# Patient Record
Sex: FEMALE | Race: WHITE | NOT HISPANIC OR LATINO | Employment: OTHER | URBAN - METROPOLITAN AREA
[De-identification: names, ages, dates, MRNs, and addresses within clinical notes are randomized per-mention and may not be internally consistent; named-entity substitution may affect disease eponyms.]

---

## 2017-01-11 ENCOUNTER — ALLSCRIPTS OFFICE VISIT (OUTPATIENT)
Dept: OTHER | Facility: OTHER | Age: 67
End: 2017-01-11

## 2017-01-19 ENCOUNTER — ALLSCRIPTS OFFICE VISIT (OUTPATIENT)
Dept: OTHER | Facility: OTHER | Age: 67
End: 2017-01-19

## 2017-02-02 ENCOUNTER — ALLSCRIPTS OFFICE VISIT (OUTPATIENT)
Dept: OTHER | Facility: OTHER | Age: 67
End: 2017-02-02

## 2017-03-08 ENCOUNTER — ALLSCRIPTS OFFICE VISIT (OUTPATIENT)
Dept: OTHER | Facility: OTHER | Age: 67
End: 2017-03-08

## 2017-08-01 ENCOUNTER — ALLSCRIPTS OFFICE VISIT (OUTPATIENT)
Dept: OTHER | Facility: OTHER | Age: 67
End: 2017-08-01

## 2017-09-08 ENCOUNTER — ALLSCRIPTS OFFICE VISIT (OUTPATIENT)
Dept: OTHER | Facility: OTHER | Age: 67
End: 2017-09-08

## 2017-09-08 DIAGNOSIS — F60.9 PERSONALITY DISORDER (HCC): ICD-10-CM

## 2017-09-08 DIAGNOSIS — F43.12 POST-TRAUMATIC STRESS DISORDER, CHRONIC: ICD-10-CM

## 2017-09-08 DIAGNOSIS — F41.1 GAD (GENERALIZED ANXIETY DISORDER): ICD-10-CM

## 2017-09-08 DIAGNOSIS — G47.09 OTHER ORGANIC INSOMNIA: ICD-10-CM

## 2017-09-08 DIAGNOSIS — F31.32 BIPOLAR I DISORDER, MOST RECENT EPISODE DEPRESSED, MODERATE (HCC): Primary | ICD-10-CM

## 2017-11-03 ENCOUNTER — ALLSCRIPTS OFFICE VISIT (OUTPATIENT)
Dept: OTHER | Facility: OTHER | Age: 67
End: 2017-11-03

## 2018-01-10 NOTE — PSYCH
Progress Note  Psychotherapy Provided [facility]: Individual Psychotherapy 50 minutes provided today  Goals addressed in session:   Goal #1  D: " I had my Right Elbow Replaced in June"   " My pain is still a 4, in my Right Hand and my Right Wrist,and I'll never be able to lift more than 1 lb  with my Right hand"   " I do some hand-exercises, that the doctor told me  "  Norma Souaustiner " Storms scare me, still----But I try to do my Deep-breathing, and I tell myself, "This will pass  "  Norma Soulier     " I also told my daughter, who is 40, that she HAS to get out of my apartment by 3 weeks from now---My apartment is for 58 and older  Dara Soulier Dara Soulier I have home Health Aides helping me  Norma Soulier Dara Soulier Dara Soulier I can't have my 6 yr old great-Grandson there either, much as I love him"  Dara Soulier Dara Soulier "His 29 yr old mother is going to have to get a place,and raise him herself, or he may actually go to [facility] "   " This is my last Therapy session---I checked with [address] ([address]), but I cannot afford the 50% payments for each session    I have a lot of doctor bills"   "I will do my one goal per day, and my Coping skills, and I will continue seeing Dr Leah Pham for my Psych meds "   Pt Sadie Treadwell) has a constricted affect  Has a moderately depressed mood  Denies SI Dana Backer [address]  Her sleep is slightly better than in the past  She continues meds as prescribed by Dr Leah Pham  She processes her thoughts, feelings,and behaviors  She does Cognitive-reframing, re: herself, and her Coping skills , going forward  She set limits on her daughter, 40, and is getting her out of pt's affordable [address], per law, pt says  She also told her granddaughter that she has to raise her own 6 yr old son, and get him into a [employer]  for the new School year, soon  We review Calm, Deep-breathing/ Relaxation/ positive Visualization exercises---pt will do at home  Kyung Pemberton says this is her last psychotherapy burke't, due to multiple Medical / Psych   expenses,and that she cannot afford [facility] arrangements, either  She may re-apply to [facility], "where I can get psych care free", she states  Active Listening, Support,and Validation also given in session  She thanks this Therapist for services  A: Bipolar, depressed, moderate, F31 32; Generalized Anxiety Disorder, F41 10; R/O PTSD, chronic, F43 12,and family stressors and medical stressors; R/O Personalty Disorder, F60 9  Tisha Lr Pt Zulema Ache) is stable for discharge  as requested  P: Pt Zulema Ache) is Discharged from Therapy  She will continue seeing Slava Murray for psych  medications  Pain Scale and Suicide Risk [facility]: Current Pain Assessment: moderate to severe   On a scale of 0 to 10, the patient rates current pain at 4   Current suicide risk is low   Behavioral Health Treatment Plan [place]: Diagnosis and Treatment Plan explained to patient, patient relates understanding diagnosis and is agreeable to Treatment Plan  Assessment    1  Bipolar I disorder, most recent episode depressed, moderate (296 52) (F31 32)   2  TATIANA (generalized anxiety disorder) (300 02) (F41 1)   3  Personality disorder (301 9) (F60 9)   4   PTSD (post-traumatic stress disorder) (309 81) (F43 10)    Signatures   Electronically signed by : Christopher Bautista MSAPRNPMHCNS-BC; Aug  1 2017 11:32AM EST                       (Author)

## 2018-01-11 NOTE — PSYCH
Psych Med Mgmt    Appearance: was calm and cooperative, adequate hygiene and grooming and good eye contact  Observed mood: mood appropriate  Observed mood: affect appropriate  Speech: a normal rate  Thought processes: coherent/organized  Hallucinations: no hallucinations present  Thought Content: no delusions  Abnormal Thoughts: The patient has no suicidal thoughts and no homicidal thoughts  Orientation: The patient is oriented to person, place and time  Recent and Remote Memory: short term memory intact and long term memory intact  Attention Span And Concentration: concentration intact  Insight: Insight intact  Judgment: Her judgment was intact  Muscle Strength And Tone  Muscle strength and tone were normal  Normal gait and station  Language: no difficulty naming common objects, no difficulty repeating a phrase and no difficulty writing a sentence  Fund of knowledge: Patient displays adequate knowledge of current events, adequate fund of knowledge regarding past history and adequate fund of knowledge regarding vocabulary  The patient is experiencing moderate to severe pain  On a scale of 0 - 10 the pain severity is a 3  Treatment Recommendations: Continue Lamictal 300 mg at 6 PM  Continue Ativan 1 mg to 2 mg bid and 2 mg at bedtime as needed  Continue Cymbalta 120 mg daily  Continue Abilify 5 mg at 6 PM  Continue Ambien CR 6 25 mg at bedtime as needed   Follows with PCP for glucose and lipid monitoring  The patient has been filing controlled prescriptions on time as prescribed according to [address]    Risks, Benefits And Possible Side Effects Of Medications: Risks, benefits, and possible side effects of medications explained to patient and patient verbalizes understanding  She reports normal appetite, normal energy level, no weight change and decrease in number of sleep hours 4       Lonza Lunch states she continues to do well since last visit  Mood remains stable, no significant depression  Anxiety is controlled well  No suicidal or homicidal ideation  No psychotic symptoms  Sleep is still poor "sometimes I don't sleep at all"  Wants to get sleep study, reports snoring at night  No side effects from medications reported  No rash  Vitals  Signs   Recorded: 36ZOZ6864 49:56XQ   Systolic: 685  Diastolic: 76  Heart Rate: 73  Height: 5 ft 4 in  Weight: 223 lb   BMI Calculated: 38 28  BSA Calculated: 2 05    Assessment    1  Bipolar I disorder, most recent episode depressed, in full remission (296 56) (F31 76)   2  Anxiety disorder, unspecified (300 00) (F41 9)   3  Personality disorder (301 9) (F60 9)   4  Insomnia (780 52) (G47 00)    Plan    1  LORazepam 1 MG Oral Tablet; Take 1 or 2 tablets twice a day and 2 tablets at   bedtime as needed    2  Cymbalta 60 MG Oral Capsule Delayed Release Particles (DULoxetine HCl);   TAKE 2 CAPSULES DAILY    3  Follow-up visit in 4 Months Evaluation and Treatment  Follow-up  Status: Hold For -   Scheduling  Requested for: 82Bll0625    4  ARIPiprazole 5 MG Oral Tablet (Abilify); Take 1 tablet at 6 PM   5  LamoTRIgine 150 MG Oral Tablet; Take 2 tablets at 6 PM    6  Zolpidem Tartrate ER 6 25 MG Oral Tablet Extended Release; TAKE 1 TABLET AT    BEDTIME AS NEEDED FOR INSOMNIA    Review of Systems    Constitutional: No fever, no chills, feels well, no tiredness, no recent weight gain or loss  Cardiovascular: no complaints of slow or fast heart rate, no chest pain, no palpitations  Respiratory: no complaints of shortness of breath, no wheezing, no dyspnea on exertion  Gastrointestinal: no complaints of abdominal pain, no constipation, no nausea, no diarrhea, no vomiting  Genitourinary: no complaints of dysuria, no incontinence, no pelvic pain, no urinary frequency  Musculoskeletal: shoulder pain  Integumentary: no complaints of skin rash, no itching, no dry skin     Neurological: no complaints of headache, no confusion, no numbness, no dizziness  Past Psychiatric History    Past Psychiatric History: Two past inpatient psychiatric admissions  3 past suicide attempts (overdose and cut wrists) - last time few years ago  Also attended partial program in past         Substance Abuse Hx    Substance Abuse History: No history of drug or alcohol use  Active Problems    1  Anxiety disorder, unspecified (300 00) (F41 9)   2  Arthritis (716 90) (M19 90)   3  Bipolar I disorder, most recent episode depressed, in full remission (296 56) (F31 76)   4  Hyperlipidemia (272 4) (E78 5)   5  Insomnia (780 52) (G47 00)   6  Personality disorder (301 9) (F60 9)   7  Scoliosis (737 30) (M41 9)   8  Vertigo (780 4) (R42)    Past Medical History    1  Denied: History of head injury   2  Denied: History of Seizures    The active problems and past medical history were reviewed and updated today  Allergies    1  Erythromycin Derivatives   2  Tetracyclines   3  Trazodone and Deriv   4  Aspirin TABS   5  Crestor TABS   6  Lipitor TABS   7  Pravachol TABS   8  Bactrim TABS    Current Meds   1  ARIPiprazole 5 MG Oral Tablet; Take 1 tablet at 6 PM;   Therapy: 43YNV1590 to (Evaluate:83Tmu5366)  Requested for: 50QFG6419; Last   Rx:00Ncv0526 Ordered   2  Cymbalta 60 MG Oral Capsule Delayed Release Particles; TAKE 2 CAPSULES DAILY; Therapy: 69GKE9685 to (Evaluate:14Oct2016)  Requested for: 23QJW8247; Last   Rx:88Nre3914 Ordered   3  LamoTRIgine 150 MG Oral Tablet; Take 2 tablets at 6 PM;   Therapy: 24JGL4371 to (Evaluate:14Oct2016)  Requested for: 66CGZ1056; Last   Rx:99Aes3341 Ordered   4  LORazepam 1 MG Oral Tablet; Take 1 or 2 tablets twice a day and 2 tablets at bedtime as   needed; Therapy: 06FSV9811 to (Evaluate:30Apr2016)  Requested for: 39Pga3362; Last   Rx:10But8831 Ordered   5  Verapamil HCl - 80 MG Oral Tablet; Therapy: 21RYG5727 to (Last Rx:39Ojl0507)  Requested for: 07Ifx0455 Ordered   6   Zolpidem Tartrate ER 6 25 MG Oral Tablet Extended Release; TAKE 1 TABLET AT    BEDTIME AS NEEDED FOR INSOMNIA; Therapy: 37Sgt9673 to 927 70 139)  Requested for: 51Bhq5388; Last   Rx:51Xzr3759 Ordered    The medication list was reviewed and updated today  Family Psych History  Mother    1  Family history of Bipolar disorder  Sister    2  Family history of Bipolar disorder  Brother    3  Family history of Bipolar disorder   4  Family history of suicide (V17 0) (Z81 8)  Aunt    5  Family history of paranoid schizophrenia (V17 0) (Z81 8)    The family history was reviewed and updated today  Social History    · Former smoker (I48 29) (F13 042)   · No alcohol use   · No drug use  The social history was reviewed and updated today  The social history was reviewed and is unchanged  , lives alone  Has adult son and daughter  Works as a  for life skills class  Associate in degree in applied sciences  No history of legal problems  No  history  History Of Phys/Sex Abuse Or Perpetration    History Of Phys/Sex Abuse or Perpetration: No history of physical or sexual abuse  End of Encounter Meds    1  LORazepam 1 MG Oral Tablet; Take 1 or 2 tablets twice a day and 2 tablets at bedtime as   needed; Therapy: 84FVT5573 to (Evaluate:24Vfn8231)  Requested for: 66Bdw8855; Last   Rx:72Jsf6236 Ordered    2  Cymbalta 60 MG Oral Capsule Delayed Release Particles (DULoxetine HCl); TAKE 2   CAPSULES DAILY; Therapy: 88ISX8772 to (Evaluate:49Lur8664)  Requested for: 47Pet0401; Last   Rx:41Qqn3740 Ordered    3  ARIPiprazole 5 MG Oral Tablet (Abilify); Take 1 tablet at 6 PM;   Therapy: 21Wac0047 to (Evaluate:98Zda8077)  Requested for: 95Itb6547; Last   Rx:27Ikq4100 Ordered   4  LamoTRIgine 150 MG Oral Tablet; Take 2 tablets at 6 PM;   Therapy: 08Byf1759 to (Evaluate:28Gqw6875)  Requested for: 70Ebq6607; Last   Rx:11Xom2950 Ordered    5   Zolpidem Tartrate ER 6 25 MG Oral Tablet Extended Release; TAKE 1 TABLET AT    BEDTIME AS NEEDED FOR INSOMNIA; Therapy: 12Rcz9822 to (Evaluate:27Dgy0589)  Requested for: 59Asy8350; Last   Rx:67Vkx4237 Ordered    6  Verapamil HCl - 80 MG Oral Tablet;    Therapy: 57FBX9101 to (Last Rx:29Tvy6130)  Requested for: 08UEN9183 Ordered    Signatures   Electronically signed by : RENEE Zepeda ; Sep 15 2016  4:21PM EST                       (Author)

## 2018-01-12 ENCOUNTER — GENERIC CONVERSION - ENCOUNTER (OUTPATIENT)
Dept: OTHER | Facility: OTHER | Age: 68
End: 2018-01-12

## 2018-01-12 ENCOUNTER — ALLSCRIPTS OFFICE VISIT (OUTPATIENT)
Dept: OTHER | Facility: OTHER | Age: 68
End: 2018-01-12

## 2018-01-12 NOTE — PSYCH
Progress Note  Psychotherapy Provided [facility]: Individual Psychotherapy 50 minutes provided today  Goals addressed in session:   Goal #1 (See Treatment Plan, completed today )  D: " I don't like looking out my kitchen window, where there is construction on the building---it reminds me of the accident in November 2016, when a big piece of plywood fell down from the high building, and hit the ground in front of me,and I hit the ground"   " My Right arm, right elbow was broken and it has a titanium andrea in it  [place] [employer] My Right Arm Pain=6 today "   " I avoid going out the front door of my building, I avoid looking at that construction area   "   " I am trying to keep my self busy   but I have Fear, anxiety---I want to get over this fear, and live my life "   " My Bipolar daughter Kira Campa, , and her 6 yr old son (my great-grandson, actually), moved in with me last week, temporarily  [place] [employer] [place] [employer] My daughter and I don't get along that well, but I'm trying to help with my grandson, before and after school, while my daughter works   "   Pt has an anxious, hyperverbal, somatically preoccupied affect  Mood is anxious and she has Bipolar depression  Pt denies SANTANA Jacobs Speaker [facility] Ariana Noguera [facility]  Pt processes more of her trauma, from the November 2016 accident at [facility] building/ construction site  Pt has Cues of the accident,and she gets occasional Flashbacks, and nightmares at night  Pt has Trauma Therapy today  We also do Deep- breathing, and Cognitive -reframing, toward use of her Survivor strengths  We do pt's Treatment Plan---pt is starting to utilize more Coping skills, and doing more activities (see Tx  Plan ) Listening, Support,and Validation also given  Pt continues meds as prescribed by her psychiatrist  Pt to try NOT to let the past trauma run her current life  A: Bipolar Disorder, deprerssed, F31 32; PTSD, F43 10; Generalized Anxiety Disorder, F41 10; and Personality Disorder; Pain  P: Continue Treatment Plan, Meds, and Psychotherapy  Pain Scale and Suicide Risk [facility]: Current Pain Assessment: moderate to severe   On a scale of 0 to 10, the patient rates current pain at 6   Current suicide risk is low   Behavioral Health Treatment Plan [facility]: Diagnosis and Treatment Plan explained to patient, patient relates understanding diagnosis and is agreeable to Treatment Plan  Assessment    1  Bipolar I disorder, most recent episode depressed, moderate (296 52) (F31 32)   2  PTSD (post-traumatic stress disorder) (309 81) (F43 10)   3  TATIANA (generalized anxiety disorder) (300 02) (F41 1)   4   Chronic pain (338 29) (G89 29)    Signatures   Electronically signed by : Ayden Cruz MSAPRNPMHCNS-BC; Feb 2 2017  4:00PM EST                       (Author)

## 2018-01-13 VITALS
DIASTOLIC BLOOD PRESSURE: 88 MMHG | WEIGHT: 234 LBS | HEART RATE: 86 BPM | HEIGHT: 64 IN | SYSTOLIC BLOOD PRESSURE: 139 MMHG | BODY MASS INDEX: 39.95 KG/M2

## 2018-01-13 NOTE — PSYCH
Psych Med Mgmt    Appearance: was calm and cooperative, adequate hygiene and grooming and good eye contact  Observed mood: depressed and anxious  Observed mood: affect was constricted  Speech: speech soft and fluent  Thought processes: coherent/organized  Hallucinations: no hallucinations present  Thought Content: no delusions  Abnormal Thoughts: The patient has no suicidal thoughts and no homicidal thoughts  Orientation: The patient is oriented to person, place and time  Recent and Remote Memory: short term memory intact and long term memory intact  Attention Span And Concentration: concentration impaired  Insight: Insight intact  Judgment: Her judgment was intact  Muscle Strength And Tone  Muscle strength and tone were normal    Language: no difficulty naming common objects, no difficulty repeating a phrase and no difficulty writing a sentence  Fund of knowledge: Patient displays adequate knowledge of current events, adequate fund of knowledge regarding past history and adequate fund of knowledge regarding vocabulary  The patient is experiencing moderate to severe pain  On a scale of 0 - 10 the pain severity is a 4  Treatment Recommendations: Continue Lamictal 300 mg at 6 PM  Continue Ativan 1 mg to 2 mg bid and 2 mg at bedtime as needed  Continue Cymbalta 120 mg daily  Continue Abilify 15 mg at 6 PM  Continue Ambien CR 6 25 mg at bedtime as needed   Follows with PCP for glucose and lipid monitoring  Does not want to see a therapist      Risks, Benefits And Possible Side Effects Of Medications: Risks, benefits, and possible side effects of medications explained to patient and patient verbalizes understanding  The patient has been filling controlled prescriptions on time as prescribed to [facility] program       She reports normal appetite, normal energy level, recent 4 lbs weight gain  and decrease in number of sleep hours 4  Joanie Mendiola states she has been feeling slightly less depressed since last visit  Still has anxiety symptoms ans states she is still trying to avoid going outside on windy days due to memories of her accident  No suicidal or homicidal ideation  No psychotic symptoms  Sleep is decreased  Appetite is adequate  No side effects from medications reported  No rash at present - had an allergic reaction to prednisone drops before her cataract surgery, received epinephrine injection and Benadryl with resolution of symptoms  Vitals  Signs   Recorded: 38CER7138 03:41PM   Heart Rate: 86  Systolic: 390  Diastolic: 88  BP Cuff Size: Large  Height: 5 ft 4 in  Weight: 234 lb   BMI Calculated: 40 17  BSA Calculated: 2 09    Assessment    1  TATIANA (generalized anxiety disorder) (300 02) (F41 1)   2  Bipolar I disorder, most recent episode depressed, moderate (296 52) (F31 32)   3  Other insomnia (780 52) (G47 09)   4  Personality disorder (301 9) (F60 9)   5  Post-traumatic stress disorder, chronic (309 81) (F43 12)    Plan    1  Follow-up visit in 2 months Evaluation and Treatment  Follow-up  Status: Hold For -   Scheduling  Requested for: 01SMH8234    2  Zolpidem Tartrate ER 6 25 MG Oral Tablet Extended Release; TAKE 1 TABLET AT    BEDTIME AS NEEDED FOR INSOMNIA; Do Not Fill Before: 72EIC7022    Review of Systems    Constitutional: recent 4 lb weight gain and feeling tired  Cardiovascular: no complaints of slow or fast heart rate, no chest pain, no palpitations  Respiratory: no complaints of shortness of breath, no wheezing, no dyspnea on exertion  Gastrointestinal: no complaints of abdominal pain, no constipation, no nausea, no diarrhea, no vomiting  Genitourinary: no complaints of dysuria, no incontinence, no pelvic pain, no urinary frequency  Musculoskeletal: hand pain  Integumentary: no complaints of skin rash, no itching, no dry skin     Neurological: no complaints of headache, no confusion, no numbness, no dizziness  Past Psychiatric History    Past Psychiatric History: Two past inpatient psychiatric admissions  3 past suicide attempts (overdose and cut wrists) - last time few years ago  Also attended partial program in past         Substance Abuse Hx    Substance Abuse History: No history of drug or alcohol use  Active Problems    1  Arthritis (716 90) (M19 90)   2  Bipolar I disorder, most recent episode depressed, moderate (296 52) (F31 32)   3  Chronic pain (338 29) (G89 29)   4  Elbow fracture, right (812 40) (S42 401A)   5  TATIANA (generalized anxiety disorder) (300 02) (F41 1)   6  Hyperlipidemia (272 4) (E78 5)   7  Other insomnia (780 52) (G47 09)   8  Personality disorder (301 9) (F60 9)   9  Post-traumatic stress disorder, chronic (309 81) (F43 12)   10  Scoliosis (737 30) (M41 9)   11  Vertigo (780 4) (R42)    Past Medical History    1  Denied: History of head injury   2  Denied: History of Seizures    The active problems and past medical history were reviewed and updated today  Allergies    1  Erythromycin Derivatives   2  Tetracyclines   3  Trazodone and Deriv   4  Aspirin TABS   5  Crestor TABS   6  Lipitor TABS   7  Pravachol TABS   8  Bactrim TABS    Current Meds   1  ARIPiprazole 15 MG Oral Tablet; TAKE 1 TABLET AT BEDTIME; Therapy: 80CJI2376 to (Evaluate:06Jan2018)  Requested for: 08Sep2017; Last   Rx:15Hph3420 Ordered   2  Cymbalta 60 MG Oral Capsule Delayed Release Particles; TAKE 2 CAPSULES DAILY; Therapy: 95ISN5164 to (Evaluate:06Jan2018)  Requested for: 93Qgk9207; Last   Rx:17Ihe2253 Ordered   3  LamoTRIgine 150 MG Oral Tablet; Take 2 tablets at 6 PM;   Therapy: 75ULZ2063 to (Evaluate:06Jan2018)  Requested for: 05Kmr7411; Last   Rx:63Auj0652 Ordered   4  LORazepam 1 MG Oral Tablet; Take 1 or 2 tablets twice a day and 2 tablets at bedtime as   needed; Therapy: 73ZAM0487 to (Evaluate:06Jan2018)  Requested for: 32Ydt5872; Last   Rx:61Qun5301 Ordered   5   Verapamil HCl - 80 MG Oral Tablet; Therapy: 61EIE7973 to (Last Rx:81Xne4118)  Requested for: 50Sqa9860 Ordered   6  Zolpidem Tartrate ER 6 25 MG Oral Tablet Extended Release; TAKE 1 TABLET AT    BEDTIME AS NEEDED FOR INSOMNIA; Therapy: 83Jpz7031 to (Evaluate:17Nsz5357)  Requested for: 39Rxl0920; Last   Rx:65Zbs7065 Ordered    The medication list was reviewed and updated today  Family Psych History  Mother    1  Family history of Bipolar disorder  Sister    2  Family history of Bipolar disorder  Brother    3  Family history of Bipolar disorder   4  Family history of suicide (V17 0) (Z81 8)  Aunt    5  Family history of paranoid schizophrenia (V17 0) (Z81 8)    The family history was reviewed and updated today  Social History    · Former smoker (V15 82) (E29 858)   · Living alone (V60 3) (Z60 2)   · No alcohol use   · No drug use   ·  (V61 03) (Z63 5)  The social history was reviewed and updated today  The social history was reviewed and is unchanged  , lives with daughter and great grandson  Has adult son and daughter  Worked as a  for life skills class, now retired  Associate in degree in applied sciences  No history of legal problems  No  history  History Of Phys/Sex Abuse Or Perpetration    History Of Phys/Sex Abuse or Perpetration: No history of physical or sexual abuse  End of Encounter Meds    1  ARIPiprazole 15 MG Oral Tablet; TAKE 1 TABLET AT BEDTIME; Therapy: 81JYJ6793 to (Evaluate:06Jan2018)  Requested for: 21Ikb7879; Last   Rx:02Gpd9316 Ordered   2  LamoTRIgine 150 MG Oral Tablet; Take 2 tablets at 6 PM;   Therapy: 45VNJ1293 to (Evaluate:06Jan2018)  Requested for: 91Xki6667; Last   Rx:42Fxf2228 Ordered    3  Cymbalta 60 MG Oral Capsule Delayed Release Particles (DULoxetine HCl); TAKE 2   CAPSULES DAILY; Therapy: 29TWX4198 to (Evaluate:06Jan2018)  Requested for: 85Rpk0176; Last   Rx:38Jfj5793 Ordered    4  LORazepam 1 MG Oral Tablet;  Take 1 or 2 tablets twice a day and 2 tablets at bedtime as   needed; Therapy: 61GBC6050 to (Evaluate:06Jan2018)  Requested for: 48Bhk7266; Last   Rx:08Sep2017 Ordered    5  Zolpidem Tartrate ER 6 25 MG Oral Tablet Extended Release; TAKE 1 TABLET AT    BEDTIME AS NEEDED FOR INSOMNIA; Therapy: 28Apr2016 to (Evaluate:15Apr2018)  Requested for: 69CRD3371; Last   Rx:18Neb6102 Ordered    6  Verapamil HCl - 80 MG Oral Tablet;    Therapy: 11QDP3381 to (Last Rx:20Uiy9998)  Requested for: 38FSX1175 Ordered    Signatures   Electronically signed by : Beryle Berkshire, M D ; Nov  3 2017  3:54PM EST                       (Author)

## 2018-01-15 NOTE — PSYCH
Progress Note  Psychotherapy Provided [facility]: Individual Psychotherapy 45 minutes provided today  Goals addressed in session:   Goal #1  D: "If I cry some days, I try to put my mind to something else, and do an activity, instead of crying all day "   " I'm able to look out my kitchen window, now, instead of avoiding it "   " The construction is still going on   "   " My Pain= 7, in my Right Elbow and right Arm, from the accident from the Construction,and from the Surgery I had---Now I found out from my doctor that some of the heads of the screws came off, in my arm,and I'll probably need other surgery  I hate the thought of more surgery, because it means MORE PAIN  Kamran Heckler Kamran Heckler And, I lost a lot of blood, in my last surgery, so I had pretty bad anemia after it   "   Pt has a subdued, quiet, moderately depressed , moderately anxious affect and mood  Her sleep is still low, only 3 hrs a night  Appetite is fair to low  Pt denies SANTANA Meier [facility] 116  PHQ9= 6 today  Pt has ongoing Pain =7, in her Right elbow/ arm  Pt processes her thoughts, feelings,and behaviors  Processes her fear of having to undergo more potential surgery on her right arm  Pt has brief tearfulness at home , sometimes---> she mathew with it by : Positive Self-talk, deep-breathing, texting her Grandchildren or friend, going to activities at [employer], going to [address] (seated), seeing her friend Zackery Moralez, going to [address]  at her building, and other positive activities  Listening, Support,and Validation given in session  Some psychoeducation given, re: Trauma responses,and also I answered her questions about personality disorders  Cognitive-reframing done, re: use of her Survivor Strengths and positives, and affirmation given for her efforts each day  Pt continues meds prescribed by physicians  Pt has severe financial stress, so I gave her the phone number of [facility]   A: Bipolar Disorder, depressed, F31 32; PTSD, F43 10;  Generalized Anxiety Disorder, F41 10, and chronic pain  P: Continue Treatment Plan, Meds,and Psychotherapy  Pain Scale and Suicide Risk [facility]: Current Pain Assessment: moderate to severe   On a scale of 0 to 10, the patient rates current pain at 7   Current suicide risk is low   Behavioral Health Treatment Plan [employer]: Diagnosis and Treatment Plan explained to patient, patient relates understanding diagnosis and is agreeable to Treatment Plan  Results/Data  PHQ-9 Adult Depression Screening [id number] 02:08PM Fatemeh Herring     Test Name Result Flag Reference   PHQ-9 Adult Depression Score 6     Over the last two weeks, how often have you been bothered by any of the following problems? Little interest or pleasure in doing things: Several days - 1  Feeling down, depressed, or hopeless: More than half the days - 2  Trouble falling or staying asleep, or sleeping too much: Several days - 1  Feeling tired or having little energy: Not at all - 0  Poor appetite or over eating: Several days - 1  Feeling bad about yourself - or that you are a failure or have let yourself or your family down: Not at all - 0  Trouble concentrating on things, such as reading the newspaper or watching television: Several days - 1  Moving or speaking so slowly that other people could have noticed  Or the opposite -  being so fidgety or restless that you have been moving around a lot more than usual: Not at all - 0  Thoughts that you would be better off dead, or of hurting yourself in some way: Not at all - 0   PHQ-9 Adult Depression Screening Negative     PHQ-9 Difficulty Level Somewhat difficult     PHQ-9 Severity Mild Depression         Assessment    1  Bipolar I disorder, most recent episode depressed, moderate (296 52) (F31 32)   2  PTSD (post-traumatic stress disorder) (309 81) (F43 10)   3  TATIANA (generalized anxiety disorder) (300 02) (F41 1)   4  Chronic pain (338 29) (G89 29)   5   Personality disorder (301 9) (F60 9)    Signatures   Electronically signed by : JOSHUA Mahoney; Mar  8 2017  2:40PM EST                       (Author)    Electronically signed by : JOSHUA Mahoney; Mar  8 2017  2:40PM EST                       (Author)

## 2018-01-16 NOTE — PSYCH
Psych Med Mgmt    Appearance: was calm and cooperative, adequate hygiene and grooming and demonstrated behavior psychomotor retardation  Observed mood: depressed and anxious  Observed mood: affect was constricted  Speech: speech soft and fluent  Thought processes: coherent/organized  Hallucinations: no hallucinations present  Thought Content: no delusions  Abnormal Thoughts: The patient has no suicidal thoughts and no homicidal thoughts  Orientation: The patient is oriented to person, place and time  Recent and Remote Memory: short term memory intact and long term memory intact  Attention Span And Concentration: concentration impaired  Insight: Insight intact  Judgment: Her judgment was intact  Muscle Strength And Tone  Muscle strength and tone were normal  Normal gait and station  Language: no difficulty naming common objects, no difficulty repeating a phrase and no difficulty writing a sentence  Fund of knowledge: Patient displays adequate knowledge of current events, adequate fund of knowledge regarding past history and adequate fund of knowledge regarding vocabulary  The patient is experiencing moderate to severe pain  On a scale of 0 - 10 the pain severity is a 3  Treatment Recommendations: Continue Lamictal 300 mg at 6 PM  Continue Ativan 1 mg to 2 mg bid and 2 mg at bedtime as needed  Continue Cymbalta 120 mg daily  Increase Abilify to 15 mg at 6 PM to help with PTSD symptoms  Continue Ambien CR 6 25 mg at bedtime as needed   Follows with PCP for glucose and lipid monitoring  No longer sees a therapist due to financial reasons  Risks, Benefits And Possible Side Effects Of Medications: Risks, benefits, and possible side effects of medications explained to patient and patient verbalizes understanding             Discussed with patient the risks of sedation, respiratory depression, impairment of ability to drive and potential for abuse and addiction related to treatment with benzodiazepine medications  The patient understands risk of treatment with benzodiazepine medications, agrees to not drive if feels impaired and agrees to take medications as prescribed  The patient has been filling controlled prescriptions on time as prescribed to [facility] program       She reports increased appetite, decreased energy, recent 8 lbs weight gain  and normal number of sleep hours  Sheree states she has been feeling depressed on and off recently - had to retire from her job due to her accident, also has financial problems  Fees sad at times, also has anxiety symptoms  No suicidal or homicidal ideation  No psychotic symptoms  Sleep is improved  Appetite is increased  No side effects from medications reported  No rash  Vitals  Signs   Recorded: 08Sep2017 02:46PM   Heart Rate: 83  Systolic: 044  Diastolic: 81  BP Cuff Size: Large  Height: 5 ft 4 in  Weight: 230 lb   BMI Calculated: 39 48  BSA Calculated: 2 08    Assessment    1  Bipolar I disorder, most recent episode depressed, moderate (296 52) (F31 32)   2  TATIANA (generalized anxiety disorder) (300 02) (F41 1)   3  Post-traumatic stress disorder, chronic (309 81) (F43 12)   4  Personality disorder (301 9) (F60 9)   5  Other insomnia (780 52) (G47 09)    Plan    1  ARIPiprazole 15 MG Oral Tablet; TAKE 1 TABLET AT BEDTIME   2  LamoTRIgine 150 MG Oral Tablet; Take 2 tablets at 6 PM    3  Cymbalta 60 MG Oral Capsule Delayed Release Particles (DULoxetine HCl);   TAKE 2 CAPSULES DAILY    4  Follow-up visit in 2 months Evaluation and Treatment  Follow-up  Status: Hold For -   Scheduling  Requested for: 08Sep2017    5  LORazepam 1 MG Oral Tablet; Take 1 or 2 tablets twice a day and 2 tablets at   bedtime as needed    6   Zolpidem Tartrate ER 6 25 MG Oral Tablet Extended Release; TAKE 1 TABLET AT    BEDTIME AS NEEDED FOR INSOMNIA; Do Not Fill Before: [id number]    Review of Systems    Constitutional: recent 8 lb weight gain and feeling tired  Cardiovascular: no complaints of slow or fast heart rate, no chest pain, no palpitations  Respiratory: no complaints of shortness of breath, no wheezing, no dyspnea on exertion  Gastrointestinal: no complaints of abdominal pain, no constipation, no nausea, no diarrhea, no vomiting  Genitourinary: no complaints of dysuria, no incontinence, no pelvic pain, no urinary frequency  Musculoskeletal: hand pain  Integumentary: no complaints of skin rash, no itching, no dry skin  Neurological: no complaints of headache, no confusion, no numbness, no dizziness  Past Psychiatric History    Past Psychiatric History: Two past inpatient psychiatric admissions  3 past suicide attempts (overdose and cut wrists) - last time few years ago  Also attended partial program in past         Substance Abuse Hx    Substance Abuse History: No history of drug or alcohol use  Active Problems    1  Arthritis (716 90) (M19 90)   2  Bipolar I disorder, most recent episode depressed, moderate (296 52) (F31 32)   3  Chronic pain (338 29) (G89 29)   4  Elbow fracture, right (812 40) (S42 401A)   5  TATIANA (generalized anxiety disorder) (300 02) (F41 1)   6  Hyperlipidemia (272 4) (E78 5)   7  Personality disorder (301 9) (F60 9)   8  Scoliosis (737 30) (M41 9)   9  Vertigo (780 4) (R42)    Past Medical History    1  Denied: History of head injury   2  Denied: History of Seizures    The active problems and past medical history were reviewed and updated today  Allergies    1  Erythromycin Derivatives   2  Tetracyclines   3  Trazodone and Deriv   4  Aspirin TABS   5  Crestor TABS   6  Lipitor TABS   7  Pravachol TABS   8  Bactrim TABS    Current Meds   1  ARIPiprazole 10 MG Oral Tablet; TAKE 1 TABLET AT BEDTIME; Therapy: 96NBP9399 to (Evaluate:10Jun2017)  Requested for: 46DET3794; Last   Rx:11Jan2017 Ordered   2   Cymbalta 60 MG Oral Capsule Delayed Release Particles; TAKE 2 CAPSULES DAILY; Therapy: 57PLX9504 to ([phone])  Requested for: 27Wdl6615; Last   Rx:79Qks9344 Ordered   3  LamoTRIgine 150 MG Oral Tablet; Take 2 tablets at 6 PM;   Therapy: 43Diq3023 to (Evaluate:10Jun2017)  Requested for: 31SEL9669; Last   Rx:11Jan2017 Ordered   4  LORazepam 1 MG Oral Tablet; Take 1 or 2 tablets twice a day and 2 tablets at bedtime as   needed; Therapy: 83BTQ7503 to (Evaluate:82Xrk6834)  Requested for: 31XXE9797; Last   Rx:11Jan2017 Ordered   5  Verapamil HCl - 80 MG Oral Tablet; Therapy: 10HXH2833 to (Last Rx:01Feb2011)  Requested for: 01Feb2011 Ordered   6  Zolpidem Tartrate ER 6 25 MG Oral Tablet Extended Release; TAKE 1 TABLET AT    BEDTIME AS NEEDED FOR INSOMNIA; Therapy: 28Apr2016 to (Evaluate:17Oct2017)  Requested for: 77DDQ7650; Last   Rx:07Sgh0791 Ordered    The medication list was reviewed and updated today  Family Psych History  Mother    1  Family history of Bipolar disorder  Sister    2  Family history of Bipolar disorder  Brother    3  Family history of Bipolar disorder   4  Family history of suicide (V17 0) (Z81 8)  Aunt    5  Family history of paranoid schizophrenia (V17 0) (Z81 8)    The family history was reviewed and updated today  Social History    · Former smoker (V15 82) (Z07 044)   · Living alone (V60 3) (Z60 2)   · No alcohol use   · No drug use   ·  (V61 03) (Z63 5)  The social history was reviewed and updated today  , lives with daughter and great grandson  Has adult son and daughter  Worked as a  for life skills class, now retired  Associate in degree in applied sciences  No history of legal problems  No  history  History Of Phys/Sex Abuse Or Perpetration    History Of Phys/Sex Abuse or Perpetration: No history of physical or sexual abuse  End of Encounter Meds    1  ARIPiprazole 15 MG Oral Tablet; TAKE 1 TABLET AT BEDTIME;    Therapy: 91EEB8535 to (Evaluate:06Jan2018)  Requested for: 08Sep2017; Last   Rx:08Sep2017 Ordered   2  LamoTRIgine 150 MG Oral Tablet; Take 2 tablets at 6 PM;   Therapy: 50DBN9291 to (Evaluate:06Jan2018)  Requested for: 08Sep2017; Last   Rx:08Sep2017 Ordered    3  Cymbalta 60 MG Oral Capsule Delayed Release Particles (DULoxetine HCl); TAKE 2   CAPSULES DAILY; Therapy: 53LWV1986 to (Evaluate:06Jan2018)  Requested for: 08Sep2017; Last   Rx:08Sep2017 Ordered    4  LORazepam 1 MG Oral Tablet; Take 1 or 2 tablets twice a day and 2 tablets at bedtime as   needed; Therapy: 10BPI3145 to (Evaluate:06Jan2018)  Requested for: 08Sep2017; Last   Rx:08Sep2017 Ordered    5  Zolpidem Tartrate ER 6 25 MG Oral Tablet Extended Release; TAKE 1 TABLET AT    BEDTIME AS NEEDED FOR INSOMNIA; Therapy: 28Apr2016 to (Evaluate:66Vwy1740)  Requested for: 08Sep2017; Last   Rx:08Sep2017 Ordered    6  Verapamil HCl - 80 MG Oral Tablet;    Therapy: 88YIY0574 to (Last Rx:20Kvh2395)  Requested for: 76AOK0754 Ordered    Signatures   Electronically signed by : RENEE Pollard ; Sep  8 2017  3:03PM EST                       (Author)

## 2018-01-16 NOTE — PSYCH
Psych Med Mgmt    Appearance: was calm and cooperative, adequate hygiene and grooming and good eye contact  Observed mood: mood appropriate  Observed mood: affect appropriate  Speech: a normal rate  Thought processes: coherent/organized  Hallucinations: no hallucinations present  Thought Content: no delusions  Abnormal Thoughts: The patient has no suicidal thoughts and no homicidal thoughts  Orientation: The patient is oriented to person, place and time  Recent and Remote Memory: short term memory intact and long term memory intact  Attention Span And Concentration: concentration intact  Insight: Insight intact  Judgment: Her judgment was intact  Muscle Strength And Tone  Muscle strength and tone were normal  Normal gait and station  Language: no difficulty naming common objects and no difficulty repeating a phrase  Fund of knowledge: Patient displays adequate knowledge of current events, adequate fund of knowledge regarding past history and adequate fund of knowledge regarding vocabulary  The patient is experiencing moderate to severe pain  On a scale of 0 - 10 the pain severity is a 5  Treatment Recommendations: Continue Lamictal 300 mg at 6 PM  Continue Ativan 1 mg to 2 mg bid and 2 mg at bedtime as needed  Continue Cymbalta 120 mg daily  Continue Abilify 5 mg at 6 PM  Continue Ambien CR 6 25 mg at bedtime as needed   Follows with PCP for glucose and lipid monitoring  Risks, Benefits And Possible Side Effects Of Medications: Risks, benefits, and possible side effects of medications explained to patient and patient verbalizes understanding  She reports normal appetite, normal energy level, recent 2 lbs weight gain  and decrease in number of sleep hours 4  Patient states she has been going fairly well since last visit  Mood has been stable, no significant depression  Anxiety is controlled  No suicidal or homicidal ideation  No psychotic symptoms    Sleep remains poor "I sleep only 4 hours"  No side effects from medications reported  No rash  Vitals  Signs [Data Includes: Current Encounter]   Recorded: 63LEL8271 04:22PM   Heart Rate: 75  Systolic: 297  Diastolic: 83  Height: 5 ft 4 in  Weight: 223 lb   BMI Calculated: 38 28  BSA Calculated: 2 05    Assessment    1  Personality disorder (301 9) (F60 9)   2  Bipolar I disorder, most recent episode depressed, in full remission (296 56) (F31 76)   3  Anxiety disorder, unspecified (300 00) (F41 9)   4  Insomnia (780 52) (G47 00)    Plan    1  Cymbalta 60 MG Oral Capsule Delayed Release Particles (DULoxetine HCl);   TAKE 2 CAPSULES DAILY   2  Follow-up visit in 4 Months Evaluation and Treatment  Follow-up  Status: Hold For -   Scheduling  Requested for: 34ZIA8286    3  ARIPiprazole 5 MG Oral Tablet (Abilify); Take 1 tablet at 6 PM   4  LamoTRIgine 150 MG Oral Tablet; Take 2 tablets at 6 PM    5  Zolpidem Tartrate ER 6 25 MG Oral Tablet Extended Release; TAKE 1 TABLET AT    BEDTIME AS NEEDED FOR INSOMNIA    Review of Systems    Constitutional: recent 2 lb weight gain  Cardiovascular: no complaints of slow or fast heart rate, no chest pain, no palpitations  Respiratory: no complaints of shortness of breath, no wheezing, no dyspnea on exertion  Gastrointestinal: no complaints of abdominal pain, no constipation, no nausea, no diarrhea, no vomiting  Genitourinary: no complaints of dysuria, no incontinence, no pelvic pain, no urinary frequency  Musculoskeletal: arthralgias and knee pain  Integumentary: no complaints of skin rash, no itching, no dry skin  Neurological: no complaints of headache, no confusion, no numbness, no dizziness  Past Psychiatric History    Past Psychiatric History: Two past inpatient psychiatric admissions  3 past suicide attempts (overdose and cut wrists) - last time few years ago   Also attended partial program in past         Substance Abuse Hx    Substance Abuse History: No history of drug or alcohol use  Active Problems    1  Bipolar I disorder, most recent episode depressed, in full remission (296 56) (F31 76)   2  Insomnia (780 52) (G47 00)   3  Personality disorder (301 9) (F60 9)    Past Medical History    1  Denied: History of head injury   2  Denied: History of Seizures    The active problems and past medical history were reviewed and updated today  Allergies    1  Erythromycin Derivatives   2  Tetracyclines   3  Trazodone and Deriv   4  Aspirin TABS   5  Crestor TABS   6  Lipitor TABS   7  Pravachol TABS   8  Bactrim TABS    Current Meds   1  ARIPiprazole 5 MG Oral Tablet; Take 1 tablet at 6 PM;   Therapy: 75UNN5549 to (Evaluate:47Gfn6584)  Requested for: 15BLQ0721; Last   Rx:30Mar2016 Ordered   2  Cymbalta 60 MG Oral Capsule Delayed Release Particles; TAKE 2 CAPSULES DAILY; Therapy: 08CBG1646 to (Evaluate:30Apr2016)  Requested for: 41Mfr4153; Last   Rx:60Gyi6357 Ordered   3  LamoTRIgine 150 MG Oral Tablet; Take 2 tablets at 6 PM;   Therapy: 01BUF7099 to (Evaluate:30Apr2016)  Requested for: 95Pyv1559; Last   Rx:72Dfh3081 Ordered   4  LORazepam 1 MG Oral Tablet; Take 1 or 2 tablets twice a day and 2 tablets at bedtime as   needed; Therapy: 08QRR9110 to (Evaluate:30Apr2016)  Requested for: 37Slz0937; Last   Rx:63Nyi2860 Ordered   5  Verapamil HCl - 80 MG Oral Tablet; Therapy: 38YGX8956 to (Last Rx:09Kuq0511)  Requested for: 57Dru7127 Ordered   6  Zolpidem Tartrate ER 6 25 MG Oral Tablet Extended Release; TAKE 1 TABLET AT    BEDTIME AS NEEDED FOR INSOMNIA; Therapy: 40DYE9959 to (Evaluate:27Jun2016); Last Rx:28Apr2016 Ordered    The medication list was reviewed and updated today  Family Psych History  Mother    1  Family history of Bipolar disorder  Sister    2  Family history of Bipolar disorder  Brother    3  Family history of Bipolar disorder   4  Family history of suicide (V17 0) (Z81 8)  Aunt    5   Family history of paranoid schizophrenia (V17 0) (Z81 8)    The family history was reviewed and updated today  Social History    · Former smoker (C01 95) (N13 599)   · No alcohol use   · No drug use  The social history was reviewed and updated today  The social history was reviewed and is unchanged  , lives alone  Has adult son and daughter  Works as a  for life skills class  Associate in degree in applied sciences  No history of legal problems  No  history  History Of Phys/Sex Abuse Or Perpetration    History Of Phys/Sex Abuse or Perpetration: No history of physical or sexual abuse  End of Encounter Meds    1  LORazepam 1 MG Oral Tablet; Take 1 or 2 tablets twice a day and 2 tablets at bedtime as   needed; Therapy: 95QJI3345 to (Evaluate:30Apr2016)  Requested for: 56Gsd3360; Last   Rx:50Bfo8003 Ordered    2  Cymbalta 60 MG Oral Capsule Delayed Release Particles (DULoxetine HCl); TAKE 2   CAPSULES DAILY; Therapy: 69LKA4694 to (Evaluate:14Oct2016)  Requested for: 27VXA3681; Last   Rx:32Yya7657 Ordered    3  ARIPiprazole 5 MG Oral Tablet (Abilify); Take 1 tablet at 6 PM;   Therapy: 66BPU8075 to (Evaluate:93Nmu9745)  Requested for: 06VDJ1585; Last   Rx:77Ixq5603 Ordered   4  LamoTRIgine 150 MG Oral Tablet; Take 2 tablets at 6 PM;   Therapy: 10FHZ9195 to (Evaluate:14Oct2016)  Requested for: 73SLE7106; Last   Rx:03Ent6631 Ordered    5  Zolpidem Tartrate ER 6 25 MG Oral Tablet Extended Release; TAKE 1 TABLET AT    BEDTIME AS NEEDED FOR INSOMNIA; Therapy: 47Qle5077 to (Evaluate:53Fmg0628); Last Rx:81Lgy9096 Ordered    6  Verapamil HCl - 80 MG Oral Tablet;    Therapy: 94XPY6990 to (Last Rx:46Bno7038)  Requested for: 21JAB3908 Ordered    Signatures   Electronically signed by : RENEE Gaitan ; May 17 2016  4:35PM EST                       (Author)
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
## 2018-01-22 VITALS
BODY MASS INDEX: 37.9 KG/M2 | SYSTOLIC BLOOD PRESSURE: 113 MMHG | HEIGHT: 64 IN | WEIGHT: 222 LBS | HEART RATE: 86 BPM | DIASTOLIC BLOOD PRESSURE: 74 MMHG

## 2018-01-22 VITALS
BODY MASS INDEX: 39.27 KG/M2 | WEIGHT: 230 LBS | HEART RATE: 83 BPM | HEIGHT: 64 IN | DIASTOLIC BLOOD PRESSURE: 81 MMHG | SYSTOLIC BLOOD PRESSURE: 133 MMHG

## 2018-01-24 VITALS
BODY MASS INDEX: 40.46 KG/M2 | WEIGHT: 237 LBS | HEART RATE: 82 BPM | DIASTOLIC BLOOD PRESSURE: 72 MMHG | SYSTOLIC BLOOD PRESSURE: 119 MMHG | HEIGHT: 64 IN

## 2018-02-26 NOTE — PSYCH
Psych Med Mgmt    Appearance: was calm and cooperative, adequate hygiene and grooming and good eye contact  Observed mood: anxious  Observed mood: affect was constricted  Speech: a normal rate and fluent  Thought processes: coherent/organized  Hallucinations: no hallucinations present  Thought Content: no delusions  Abnormal Thoughts: The patient has no suicidal thoughts and no homicidal thoughts  Orientation: The patient is oriented to person, place and time  Recent and Remote Memory: short term memory intact and long term memory intact  Attention Span And Concentration: concentration impaired  Insight: Insight intact  Judgment: Her judgment was intact  Muscle Strength And Tone  Muscle strength and tone were normal  Normal gait and station  Language: no difficulty naming common objects, no difficulty repeating a phrase and no difficulty writing a sentence  Fund of knowledge: Patient displays adequate knowledge of current events, adequate fund of knowledge regarding past history and adequate fund of knowledge regarding vocabulary  The patient is experiencing moderate to severe pain  On a scale of 0 - 10 the pain severity is a 4  Treatment Recommendations: Continue Lamictal 300 mg at 6 PM  Continue Ativan 1 mg to 2 mg bid and 2 mg at bedtime as needed  Continue Cymbalta 120 mg daily  Continue Abilify 15 mg at 6 PM  Continue Ambien CR 6 25 mg at bedtime as needed   Follows with PCP for glucose and lipid monitoring  Does not want to see a therapist      Risks, Benefits And Possible Side Effects Of Medications: Risks, benefits, and possible side effects of medications explained to patient and patient verbalizes understanding  Discussed with patient the risks of sedation, respiratory depression, impairment of ability to drive and potential for abuse and addiction related to treatment with benzodiazepine medications   The patient understands risk of treatment with benzodiazepine medications, agrees to not drive if feels impaired and agrees to take medications as prescribed  The patient has been filling controlled prescriptions on time as prescribed to [facility] program       She reports normal appetite, normal energy level, recent 3 lbs weight gain  and normal number of sleep hours  Alexandria Brower states she has been doing better since last visit  She still has anxiety symptoms, but feels her mood is more stable otherwise and depression has resolved  States she has been thinking more positively about the future  She still gets more anxiety with windy weather "because that's how it happened"  No suicidal or homicidal ideation  No psychotic symptoms  Sleep is improved  Appetite is adequate  No side effects from medications reported  No rash  Vitals  Signs   Recorded: 12Jan2018 03:24PM   Heart Rate: 82  Systolic: 456  Diastolic: 72  BP Cuff Size: Large  Height: 5 ft 4 in  Weight: 237 lb   BMI Calculated: 40 68  BSA Calculated: 2 1    Assessment    1  TATIANA (generalized anxiety disorder) (300 02) (F41 1)   2  Personality disorder (301 9) (F60 9)   3  Post-traumatic stress disorder, chronic (309 81) (F43 12)   4  Bipolar I disorder, most recent episode depressed, in full remission (296 56) (F31 76)   5  Other insomnia (780 52) (G47 09)    Plan    1  ARIPiprazole 15 MG Oral Tablet; TAKE 1 TABLET AT BEDTIME   2  LamoTRIgine 150 MG Oral Tablet; Take 2 tablets at 6 PM    3  Cymbalta 60 MG Oral Capsule Delayed Release Particles (DULoxetine HCl);   TAKE 2 CAPSULES DAILY    4  Follow-up visit in 3 months Evaluation and Treatment  Follow-up  Status: Hold For -   Scheduling  Requested for: 12Jan2018    5  LORazepam 1 MG Oral Tablet; Take 1 or 2 tablets twice a day and 2 tablets at   bedtime as needed    Review of Systems    Constitutional: recent 3 lb weight gain     Cardiovascular: no complaints of slow or fast heart rate, no chest pain, no palpitations  Respiratory: no complaints of shortness of breath, no wheezing, no dyspnea on exertion  Gastrointestinal: no complaints of abdominal pain, no constipation, no nausea, no diarrhea, no vomiting  Genitourinary: no complaints of dysuria, no incontinence, no pelvic pain, no urinary frequency  Musculoskeletal: hand pain  Integumentary: no complaints of skin rash, no itching, no dry skin  Neurological: no complaints of headache, no confusion, no numbness, no dizziness  Past Psychiatric History    Past Psychiatric History: Two past inpatient psychiatric admissions  3 past suicide attempts (overdose and cut wrists) - last time few years ago  Also attended partial program in past         Substance Abuse Hx    Substance Abuse History: No history of drug or alcohol use  Active Problems    1  Arthritis (716 90) (M19 90)   2  Chronic pain (338 29) (G89 29)   3  Elbow fracture, right (812 40) (S42 401A)   4  TATIANA (generalized anxiety disorder) (300 02) (F41 1)   5  Hyperlipidemia (272 4) (E78 5)   6  Other insomnia (780 52) (G47 09)   7  Personality disorder (301 9) (F60 9)   8  Post-traumatic stress disorder, chronic (309 81) (F43 12)   9  Scoliosis (737 30) (M41 9)   10  Vertigo (780 4) (R42)    Past Medical History    1  Denied: History of head injury   2  Denied: History of Seizures    The active problems and past medical history were reviewed and updated today  Allergies    1  Erythromycin Derivatives   2  Tetracyclines   3  Trazodone and Deriv   4  Aspirin TABS   5  Crestor TABS   6  Lipitor TABS   7  Pravachol TABS   8  Bactrim TABS    Current Meds   1  ARIPiprazole 15 MG Oral Tablet; TAKE 1 TABLET AT BEDTIME; Therapy: 04OJX3540 to (Evaluate:06Jan2018)  Requested for: 08Sep2017; Last   Rx:08Sep2017 Ordered   2  Cymbalta 60 MG Oral Capsule Delayed Release Particles; TAKE 2 CAPSULES DAILY;    Therapy: 30YPE4338 to (Evaluate:06Jan2018)  Requested for: 08Sep2017; Last   RC:35DUU2351 Ordered   3  LamoTRIgine 150 MG Oral Tablet; Take 2 tablets at 6 PM;   Therapy: 60IJO8359 to (Evaluate:06Jan2018)  Requested for: 13Ahj8311; Last   Rx:08Sep2017 Ordered   4  LORazepam 1 MG Oral Tablet; Take 1 or 2 tablets twice a day and 2 tablets at bedtime as   needed; Therapy: 77LSU8539 to (Evaluate:06Jan2018)  Requested for: 49Rmr6600; Last   Rx:08Sep2017 Ordered   5  Verapamil HCl - 80 MG Oral Tablet; Therapy: 00UIJ2287 to (Last Rx:01Feb2011)  Requested for: 01Feb2011 Ordered   6  Zolpidem Tartrate ER 6 25 MG Oral Tablet Extended Release; TAKE 1 TABLET AT    BEDTIME AS NEEDED FOR INSOMNIA; Therapy: 28Apr2016 to (Evaluate:15Apr2018)  Requested for: 12RXJ3050; Last   Rx:03Nov2017 Ordered    The medication list was reviewed and updated today  Family Psych History  Mother    1  Family history of Bipolar disorder  Sister    2  Family history of Bipolar disorder  Brother    3  Family history of Bipolar disorder   4  Family history of suicide (V17 0) (Z81 8)  Aunt    5  Family history of paranoid schizophrenia (V17 0) (Z81 8)    The family history was reviewed and updated today  Social History    · Former smoker (V15 82) (R88 804)   · Living alone (V60 3) (Z60 2)   · No alcohol use   · No drug use   ·  (V61 03) (Z63 5)  The social history was reviewed and updated today  The social history was reviewed and is unchanged  , lives with daughter and great grandson  Has adult son and daughter  Worked as a  for life skills class, now retired  Associate in degree in applied sciences  No history of legal problems  No  history  History Of Phys/Sex Abuse Or Perpetration    History Of Phys/Sex Abuse or Perpetration: No history of physical or sexual abuse  End of Encounter Meds    1  ARIPiprazole 15 MG Oral Tablet; TAKE 1 TABLET AT BEDTIME; Therapy: 34IDB2405 to (Evaluate:12May2018)  Requested for: 49QHE4174;  Last   Rx:12Jan2018; Status: ACTIVE - Transmit to Pharmacy - Awaiting Verification Ordered   2  LamoTRIgine 150 MG Oral Tablet; Take 2 tablets at 6 PM;   Therapy: 14DOB7666 to (Evaluate:12May2018)  Requested for: 32UKM2086; Last   Rx:12Jan2018; Status: ACTIVE - Transmit to Pharmacy - Awaiting Verification Ordered    3  Cymbalta 60 MG Oral Capsule Delayed Release Particles (DULoxetine HCl); TAKE 2   CAPSULES DAILY; Therapy: 83IBQ6538 to (Evaluate:12May2018)  Requested for: 73PXI7627; Last   Rx:12Jan2018; Status: ACTIVE - Transmit to Pharmacy - Awaiting Verification Ordered    4  LORazepam 1 MG Oral Tablet; Take 1 or 2 tablets twice a day and 2 tablets at bedtime as   needed; Therapy: 34EPU1150 to (Evaluate:12May2018)  Requested for: 14IIA0245; Last   Rx:12Jan2018 Ordered    5  Zolpidem Tartrate ER 6 25 MG Oral Tablet Extended Release; TAKE 1 TABLET AT    BEDTIME AS NEEDED FOR INSOMNIA; Therapy: 28Apr2016 to (Evaluate:15Apr2018)  Requested for: 01JED9736; Last   Rx:03Nov2017 Ordered    6  Verapamil HCl - 80 MG Oral Tablet;    Therapy: 17FCN8509 to (Last Rx:28Fqe3672)  Requested for: 14WQI0710 Ordered    Signatures   Electronically signed by : RENEE Buck ; Jan 12 2018  3:35PM EST                       (Author)

## 2018-03-16 ENCOUNTER — TELEPHONE (OUTPATIENT)
Dept: PSYCHIATRY | Facility: CLINIC | Age: 68
End: 2018-03-16

## 2018-03-19 ENCOUNTER — TELEPHONE (OUTPATIENT)
Dept: BEHAVIORAL/MENTAL HEALTH CLINIC | Facility: CLINIC | Age: 68
End: 2018-03-19

## 2018-03-20 ENCOUNTER — TELEPHONE (OUTPATIENT)
Dept: PSYCHIATRY | Facility: CLINIC | Age: 68
End: 2018-03-20

## 2018-03-20 ENCOUNTER — TELEPHONE (OUTPATIENT)
Dept: BEHAVIORAL/MENTAL HEALTH CLINIC | Facility: CLINIC | Age: 68
End: 2018-03-20

## 2018-03-20 DIAGNOSIS — G47.09 OTHER INSOMNIA: Primary | ICD-10-CM

## 2018-03-20 RX ORDER — ZOLPIDEM TARTRATE 6.25 MG/1
1 TABLET, FILM COATED, EXTENDED RELEASE ORAL
COMMUNITY
Start: 2016-04-28 | End: 2018-03-20 | Stop reason: SDUPTHER

## 2018-03-20 RX ORDER — ZOLPIDEM TARTRATE 6.25 MG/1
6.25 TABLET, FILM COATED, EXTENDED RELEASE ORAL
Qty: 30 TABLET | Refills: 0 | OUTPATIENT
Start: 2018-03-20 | End: 2018-04-12 | Stop reason: SDUPTHER

## 2018-03-20 NOTE — TELEPHONE ENCOUNTER
Patient called stating that she contacted her pharmacy and they told her she has no refills left  She does not know what to do, and I informed her that Dr Barak Golden is not in the office all week

## 2018-04-10 PROBLEM — F43.12 POST-TRAUMATIC STRESS DISORDER, CHRONIC: Chronic | Status: ACTIVE | Noted: 2017-09-08

## 2018-04-10 PROBLEM — F41.1 GAD (GENERALIZED ANXIETY DISORDER): Status: ACTIVE | Noted: 2017-01-11

## 2018-04-10 PROBLEM — F43.12 POST-TRAUMATIC STRESS DISORDER, CHRONIC: Status: ACTIVE | Noted: 2017-09-08

## 2018-04-10 PROBLEM — F31.76 BIPOLAR I DISORDER, MOST RECENT EPISODE DEPRESSED, IN FULL REMISSION (HCC): Chronic | Status: ACTIVE | Noted: 2018-01-12

## 2018-04-10 PROBLEM — F41.1 GAD (GENERALIZED ANXIETY DISORDER): Chronic | Status: ACTIVE | Noted: 2017-01-11

## 2018-04-10 PROBLEM — G89.29 CHRONIC PAIN: Status: ACTIVE | Noted: 2017-01-19

## 2018-04-10 PROBLEM — G47.09 OTHER INSOMNIA: Chronic | Status: ACTIVE | Noted: 2017-09-08

## 2018-04-10 PROBLEM — F31.76 BIPOLAR I DISORDER, MOST RECENT EPISODE DEPRESSED, IN FULL REMISSION (HCC): Status: ACTIVE | Noted: 2018-01-12

## 2018-04-10 RX ORDER — ARIPIPRAZOLE 15 MG/1
1 TABLET ORAL
COMMUNITY
Start: 2012-02-01 | End: 2018-08-03 | Stop reason: SDUPTHER

## 2018-04-10 RX ORDER — LORAZEPAM 1 MG/1
1-2 TABLET ORAL 2 TIMES DAILY
COMMUNITY
Start: 2012-02-01 | End: 2018-04-12 | Stop reason: SDUPTHER

## 2018-04-10 RX ORDER — LAMOTRIGINE 150 MG/1
300 TABLET ORAL EVERY EVENING
COMMUNITY
Start: 2012-02-01 | End: 2018-04-12 | Stop reason: SDUPTHER

## 2018-04-10 RX ORDER — DULOXETIN HYDROCHLORIDE 60 MG/1
2 CAPSULE, DELAYED RELEASE ORAL DAILY
COMMUNITY
Start: 2012-02-01 | End: 2018-04-12 | Stop reason: SDUPTHER
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
## 2018-04-12 ENCOUNTER — OFFICE VISIT (OUTPATIENT)
Dept: PSYCHIATRY | Facility: CLINIC | Age: 68
End: 2018-04-12
Payer: MEDICARE

## 2018-04-12 VITALS
SYSTOLIC BLOOD PRESSURE: 117 MMHG | BODY MASS INDEX: 41.29 KG/M2 | WEIGHT: 233 LBS | DIASTOLIC BLOOD PRESSURE: 73 MMHG | HEIGHT: 63 IN | HEART RATE: 83 BPM

## 2018-04-12 DIAGNOSIS — F43.12 POST-TRAUMATIC STRESS DISORDER, CHRONIC: Chronic | ICD-10-CM

## 2018-04-12 DIAGNOSIS — G47.09 OTHER INSOMNIA: Chronic | ICD-10-CM

## 2018-04-12 DIAGNOSIS — F41.1 GAD (GENERALIZED ANXIETY DISORDER): Chronic | ICD-10-CM

## 2018-04-12 DIAGNOSIS — F31.31 BIPOLAR I DISORDER, MOST RECENT EPISODE DEPRESSED, MILD (HCC): Primary | Chronic | ICD-10-CM

## 2018-04-12 DIAGNOSIS — F60.9 PERSONALITY DISORDER (HCC): Chronic | ICD-10-CM

## 2018-04-12 PROBLEM — E66.01 MORBID OBESITY WITH BMI OF 40.0-44.9, ADULT (HCC): Status: ACTIVE | Noted: 2017-04-13

## 2018-04-12 PROBLEM — J45.20 MILD INTERMITTENT ASTHMA WITHOUT COMPLICATION: Status: ACTIVE | Noted: 2018-04-12

## 2018-04-12 PROBLEM — Z96.629 S/P ELBOW JOINT REPLACEMENT: Status: ACTIVE | Noted: 2017-06-27

## 2018-04-12 PROCEDURE — 90833 PSYTX W PT W E/M 30 MIN: CPT | Performed by: PSYCHIATRY & NEUROLOGY

## 2018-04-12 PROCEDURE — 99213 OFFICE O/P EST LOW 20 MIN: CPT | Performed by: PSYCHIATRY & NEUROLOGY

## 2018-04-12 RX ORDER — DIPHENOXYLATE HYDROCHLORIDE AND ATROPINE SULFATE 2.5; .025 MG/1; MG/1
1 TABLET ORAL
COMMUNITY
Start: 2017-04-25

## 2018-04-12 RX ORDER — MOMETASONE FUROATE 220 UG/1
INHALANT RESPIRATORY (INHALATION)
Refills: 3 | COMMUNITY
Start: 2018-01-23

## 2018-04-12 RX ORDER — ASCORBIC ACID 250 MG
250 TABLET ORAL
COMMUNITY
Start: 2017-07-06

## 2018-04-12 RX ORDER — DULOXETIN HYDROCHLORIDE 60 MG/1
120 CAPSULE, DELAYED RELEASE ORAL DAILY
Qty: 60 CAPSULE | Refills: 3 | Status: SHIPPED | OUTPATIENT
Start: 2018-04-12 | End: 2018-08-13 | Stop reason: SDUPTHER

## 2018-04-12 RX ORDER — ACETAMINOPHEN 500 MG
500 TABLET ORAL EVERY 6 HOURS PRN
COMMUNITY
Start: 2017-04-25

## 2018-04-12 RX ORDER — OYSTER SHELL CALCIUM WITH VITAMIN D 500; 200 MG/1; [IU]/1
1 TABLET, FILM COATED ORAL
COMMUNITY
Start: 2017-07-06

## 2018-04-12 RX ORDER — LAMOTRIGINE 150 MG/1
300 TABLET ORAL EVERY EVENING
Qty: 60 TABLET | Refills: 3 | Status: SHIPPED | OUTPATIENT
Start: 2018-04-12 | End: 2018-08-13 | Stop reason: SDUPTHER

## 2018-04-12 RX ORDER — ZOLPIDEM TARTRATE 6.25 MG/1
6.25 TABLET, FILM COATED, EXTENDED RELEASE ORAL
Qty: 30 TABLET | Refills: 3 | Status: SHIPPED | OUTPATIENT
Start: 2018-04-12 | End: 2018-08-13 | Stop reason: SDUPTHER

## 2018-04-12 RX ORDER — LORAZEPAM 1 MG/1
1-2 TABLET ORAL 2 TIMES DAILY
Qty: 120 TABLET | Refills: 3 | Status: SHIPPED | OUTPATIENT
Start: 2018-05-12 | End: 2018-08-13 | Stop reason: SDUPTHER

## 2018-04-12 RX ORDER — VERAPAMIL HYDROCHLORIDE 80 MG/1
80 TABLET ORAL 2 TIMES DAILY
COMMUNITY
Start: 2011-02-01

## 2018-04-12 NOTE — PSYCH
TREATMENT PLAN (Medication Management Only)        [employer]    Name and Date of Birth:  Elsy Ramires 79 y o  1950  Date of Treatment Plan: April 12, 2018  Diagnosis/Diagnoses:   1  Bipolar I disorder, most recent episode depressed, mild ([address] )    2  TATIANA (generalized anxiety disorder)    3  Post-traumatic stress disorder, chronic    4  Personality disorder    5  Other insomnia    Strengths/Personal Resources for Self-Care: "trying to think positively; prayer"  Area/Areas of need (in own words): "anxiety level rises during windy day; issues with family"  1  Long Term Goal: improve control of anxiety  Target Date: 3 months - 7/12/2018  Person/Persons responsible for completion of goal: Manda Joy  2  Short Term Objective (s) - How will we reach this goal?:   A  Provider new recommended medication/dosage changes and/or continue medication(s): continue current medications as prescribed (Cymbalta, Lamictal, Abilify, Ativan and Ambien CR)  B   N/A   C   N/A  Target Date: 3 months - 7/12/2018  Person/Persons Responsible for Completion of Goal: Manda Joy   Progress Towards Goals: limited progress  Treatment Modality: medication management every 3 months  Review due 90 to 120 days from date of this plan: 4 months - 8/12/2018  Expected length of service: ongoing treatment  My Physician/PA/NP and I have developed this plan together and I agree to work on the goals and objectives  I understand the treatment goals that were developed for my treatment    Signature:       Date and time:  Signature of parent/guardian if under age of 15 years: Date and time:  Signature of provider:      Date and time:  Signature of Supervising Physician:    Date and time: 4/12/2018      Priscilla Moyer MD

## 2018-06-03 DIAGNOSIS — F31.31 BIPOLAR I DISORDER, MOST RECENT EPISODE DEPRESSED, MILD (HCC): Chronic | ICD-10-CM

## 2018-06-04 RX ORDER — LAMOTRIGINE 150 MG/1
TABLET ORAL
Qty: 60 TABLET | Refills: 3 | OUTPATIENT
Start: 2018-06-04

## 2018-08-03 DIAGNOSIS — F31.31 BIPOLAR I DISORDER, MOST RECENT EPISODE DEPRESSED, MILD (HCC): Primary | Chronic | ICD-10-CM

## 2018-08-07 RX ORDER — ARIPIPRAZOLE 15 MG/1
15 TABLET ORAL
Qty: 30 TABLET | Refills: 1 | Status: SHIPPED | OUTPATIENT
Start: 2018-08-07 | End: 2018-08-13 | Stop reason: SDUPTHER
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
## 2018-08-13 ENCOUNTER — OFFICE VISIT (OUTPATIENT)
Dept: PSYCHIATRY | Facility: CLINIC | Age: 68
End: 2018-08-13
Payer: MEDICARE

## 2018-08-13 VITALS
WEIGHT: 231 LBS | HEART RATE: 91 BPM | BODY MASS INDEX: 40.93 KG/M2 | HEIGHT: 63 IN | DIASTOLIC BLOOD PRESSURE: 77 MMHG | SYSTOLIC BLOOD PRESSURE: 133 MMHG

## 2018-08-13 DIAGNOSIS — F31.76 BIPOLAR I DISORDER, MOST RECENT EPISODE DEPRESSED, IN FULL REMISSION (HCC): Primary | Chronic | ICD-10-CM

## 2018-08-13 DIAGNOSIS — F31.31 BIPOLAR I DISORDER, MOST RECENT EPISODE DEPRESSED, MILD (HCC): Chronic | ICD-10-CM

## 2018-08-13 DIAGNOSIS — F41.1 GAD (GENERALIZED ANXIETY DISORDER): Chronic | ICD-10-CM

## 2018-08-13 DIAGNOSIS — F60.9 PERSONALITY DISORDER (HCC): Chronic | ICD-10-CM

## 2018-08-13 DIAGNOSIS — F43.12 POST-TRAUMATIC STRESS DISORDER, CHRONIC: Chronic | ICD-10-CM

## 2018-08-13 DIAGNOSIS — G47.09 OTHER INSOMNIA: Chronic | ICD-10-CM

## 2018-08-13 PROCEDURE — 90833 PSYTX W PT W E/M 30 MIN: CPT | Performed by: PSYCHIATRY & NEUROLOGY

## 2018-08-13 PROCEDURE — 99213 OFFICE O/P EST LOW 20 MIN: CPT | Performed by: PSYCHIATRY & NEUROLOGY

## 2018-08-13 RX ORDER — LORAZEPAM 1 MG/1
1-2 TABLET ORAL 2 TIMES DAILY
Qty: 120 TABLET | Refills: 3 | Status: SHIPPED | OUTPATIENT
Start: 2018-08-13 | End: 2018-12-11 | Stop reason: SDUPTHER

## 2018-08-13 RX ORDER — DULOXETIN HYDROCHLORIDE 60 MG/1
120 CAPSULE, DELAYED RELEASE ORAL DAILY
Qty: 60 CAPSULE | Refills: 3 | Status: SHIPPED | OUTPATIENT
Start: 2018-08-13 | End: 2018-12-11 | Stop reason: SDUPTHER

## 2018-08-13 RX ORDER — CLINDAMYCIN HYDROCHLORIDE 150 MG/1
CAPSULE ORAL
Refills: 3 | COMMUNITY
Start: 2018-05-21 | End: 2020-05-06 | Stop reason: ALTCHOICE

## 2018-08-13 RX ORDER — LAMOTRIGINE 150 MG/1
300 TABLET ORAL EVERY EVENING
Qty: 60 TABLET | Refills: 3 | Status: SHIPPED | OUTPATIENT
Start: 2018-08-13 | End: 2018-12-11 | Stop reason: SINTOL

## 2018-08-13 RX ORDER — ARIPIPRAZOLE 15 MG/1
15 TABLET ORAL
Qty: 30 TABLET | Refills: 3 | Status: SHIPPED | OUTPATIENT
Start: 2018-08-13 | End: 2018-12-11 | Stop reason: SDUPTHER

## 2018-08-13 RX ORDER — METHOCARBAMOL 750 MG/1
TABLET, FILM COATED ORAL
COMMUNITY
Start: 2018-06-25 | End: 2019-05-30 | Stop reason: ALTCHOICE

## 2018-08-13 RX ORDER — ZOLPIDEM TARTRATE 6.25 MG/1
6.25 TABLET, FILM COATED, EXTENDED RELEASE ORAL
Qty: 30 TABLET | Refills: 3 | Status: SHIPPED | OUTPATIENT
Start: 2018-08-13 | End: 2018-12-11 | Stop reason: SDUPTHER

## 2018-08-13 NOTE — PSYCH
TREATMENT PLAN (Medication Management Only)        [facility]    Name/Date of Birth/MRN:  Bernarda Dickson 79 y o  1950 MRN: [id number]  Date of Treatment Plan: August 13, 2018  Diagnosis/Diagnoses:   1  Bipolar I disorder, most recent episode depressed, in full remission ([facility] [address] )    2  TATIANA (generalized anxiety disorder)    3  Post-traumatic stress disorder, chronic    4  Personality disorder    5  Other insomnia      Strengths/Personal Resources for Self-Care: "talking it out - friends and family"  Area/Areas of need (in own words): "stress"  1  Long Term Goal: improve control of anxiety  Target Date: 4 months - 12/13/2018  Person/Persons responsible for completion of goal: Aye Khan  2  Short Term Objective (s) - How will we reach this goal?:   A  Provider new recommended medication/dosage changes and/or continue medication(s): continue current medications as prescribed (Cymbalta, Lamictal, Abilify, Ativan and Ambien CR)  B   N/A   C   N/A  Target Date: 4 months - 12/13/2018  Person/Persons Responsible for Completion of Goal: Aye Khan   Progress Towards Goals: stable  Treatment Modality: medication management every 4 months  Review due 90 to 120 days from date of this plan: 4 months - 12/13/2018  Expected length of service: maintenance  My Physician/PA/NP and I have developed this plan together and I agree to work on the goals and objectives  I understand the treatment goals that were developed for my treatment    Signature:       Date and time:  Signature of parent/guardian if under age of 15 years: Date and time:  Signature of provider:      Date and time:  Signature of Supervising Physician:    Date and time: 8/13/2018      Em Rushing MD

## 2018-08-28 ENCOUNTER — HOSPITAL ENCOUNTER (OUTPATIENT)
Dept: RADIOLOGY | Facility: HOSPITAL | Age: 68
Discharge: HOME | End: 2018-08-28
Attending: PODIATRIST
Payer: MEDICARE

## 2018-08-28 ENCOUNTER — TRANSCRIBE ORDERS (OUTPATIENT)
Dept: ADMINISTRATIVE | Facility: HOSPITAL | Age: 68
End: 2018-08-28

## 2018-08-28 DIAGNOSIS — M79.672 LEFT FOOT PAIN: Primary | ICD-10-CM

## 2018-08-28 PROCEDURE — 73630 X-RAY EXAM OF FOOT: CPT
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
## 2018-12-11 ENCOUNTER — OFFICE VISIT (OUTPATIENT)
Dept: PSYCHIATRY | Facility: CLINIC | Age: 68
End: 2018-12-11
Payer: MEDICARE

## 2018-12-11 VITALS
SYSTOLIC BLOOD PRESSURE: 136 MMHG | DIASTOLIC BLOOD PRESSURE: 78 MMHG | WEIGHT: 234 LBS | HEART RATE: 80 BPM | HEIGHT: 63 IN | BODY MASS INDEX: 41.46 KG/M2

## 2018-12-11 DIAGNOSIS — F31.76 BIPOLAR I DISORDER, MOST RECENT EPISODE DEPRESSED, IN FULL REMISSION (HCC): Primary | Chronic | ICD-10-CM

## 2018-12-11 DIAGNOSIS — F60.9 PERSONALITY DISORDER (HCC): Chronic | ICD-10-CM

## 2018-12-11 DIAGNOSIS — G47.09 OTHER INSOMNIA: Chronic | ICD-10-CM

## 2018-12-11 DIAGNOSIS — F41.1 GAD (GENERALIZED ANXIETY DISORDER): Chronic | ICD-10-CM

## 2018-12-11 DIAGNOSIS — F43.12 POST-TRAUMATIC STRESS DISORDER, CHRONIC: Chronic | ICD-10-CM

## 2018-12-11 PROCEDURE — 99213 OFFICE O/P EST LOW 20 MIN: CPT | Performed by: PSYCHIATRY & NEUROLOGY

## 2018-12-11 PROCEDURE — 90833 PSYTX W PT W E/M 30 MIN: CPT | Performed by: PSYCHIATRY & NEUROLOGY

## 2018-12-11 RX ORDER — ZOLPIDEM TARTRATE 6.25 MG/1
6.25 TABLET, FILM COATED, EXTENDED RELEASE ORAL
Qty: 30 TABLET | Refills: 3 | Status: SHIPPED | OUTPATIENT
Start: 2018-12-11 | End: 2019-02-25 | Stop reason: CLARIF

## 2018-12-11 RX ORDER — ALBUTEROL SULFATE 90 UG/1
AEROSOL, METERED RESPIRATORY (INHALATION)
COMMUNITY

## 2018-12-11 RX ORDER — LORAZEPAM 1 MG/1
1-2 TABLET ORAL 2 TIMES DAILY
Qty: 120 TABLET | Refills: 3 | Status: SHIPPED | OUTPATIENT
Start: 2018-12-11 | End: 2019-05-30 | Stop reason: SDUPTHER

## 2018-12-11 RX ORDER — DULOXETIN HYDROCHLORIDE 60 MG/1
120 CAPSULE, DELAYED RELEASE ORAL DAILY
Qty: 60 CAPSULE | Refills: 3 | Status: SHIPPED | OUTPATIENT
Start: 2018-12-11 | End: 2019-04-04 | Stop reason: SDUPTHER

## 2018-12-11 RX ORDER — ARIPIPRAZOLE 15 MG/1
15 TABLET ORAL
Qty: 30 TABLET | Refills: 3 | Status: SHIPPED | OUTPATIENT
Start: 2018-12-11 | End: 2019-04-18 | Stop reason: SDUPTHER

## 2018-12-11 NOTE — PSYCH
TREATMENT PLAN (Medication Management Only)        [facility]    Name/Date of Birth/MRN:  Gioia Schirmer 79 y o  1950 MRN: [id number]  Date of Treatment Plan: December 11, 2018  Diagnosis/Diagnoses:   1  Bipolar I disorder, most recent episode depressed, in full remission ([facility] )    2  TATIANA (generalized anxiety disorder)    3  Post-traumatic stress disorder, chronic    4  Personality disorder ([facility] )    5  Other insomnia      Strengths/Personal Resources for Self-Care: "I feel strong today due to the fact that [place] moved out"  Area/Areas of need (in own words): "I need weight loss plan  I was overeating due to past depression"  1  Long Term Goal: maintain mood stability  Target Date: 3 months - 3/11/2019  Person/Persons responsible for completion of goal: Basia Watson  2  Short Term Objective (s) - How will we reach this goal?:   A  Provider new recommended medication/dosage changes and/or continue medication(s): discontinue Lamictal Maple Kelp stopped Lamicatl on her own), continue all other medications (Cymbalta, Abilify, Ativan and Ambien CR)  B   N/A   C   N/A  Target Date: 3 months - 3/11/2019  Person/Persons Responsible for Completion of Goal: Basia Watson   Progress Towards Goals: stable  Treatment Modality: medication management every 3 months  Review due 90 to 120 days from date of this plan: 4 months - 4/11/2019  Expected length of service: maintenance  My Physician/PA/NP and I have developed this plan together and I agree to work on the goals and objectives  I understand the treatment goals that were developed for my treatment    Signature:       Date and time:  Signature of parent/guardian if under age of 15 years: Date and time:  Signature of provider:      Date and time:  Signature of Supervising Physician:    Date and time: 12/11/2018      Florencio Hopkins MD

## 2019-02-15 ENCOUNTER — APPOINTMENT (OUTPATIENT)
Dept: LAB | Facility: HOSPITAL | Age: 69
End: 2019-02-15
Attending: PODIATRIST
Payer: COMMERCIAL

## 2019-02-15 ENCOUNTER — TRANSCRIBE ORDERS (OUTPATIENT)
Dept: ADMINISTRATIVE | Facility: HOSPITAL | Age: 69
End: 2019-02-15

## 2019-02-15 ENCOUNTER — HOSPITAL ENCOUNTER (OUTPATIENT)
Dept: RADIOLOGY | Facility: HOSPITAL | Age: 69
Discharge: HOME | End: 2019-02-15
Attending: PODIATRIST
Payer: COMMERCIAL

## 2019-02-15 DIAGNOSIS — M20.12 ACQUIRED HALLUX VALGUS OF LEFT FOOT: ICD-10-CM

## 2019-02-15 DIAGNOSIS — Z01.89 DIAGNOSTIC SKIN AND SENSITIZATION TESTS: ICD-10-CM

## 2019-02-15 DIAGNOSIS — Z01.89 DIAGNOSTIC SKIN AND SENSITIZATION TESTS: Primary | ICD-10-CM

## 2019-02-15 DIAGNOSIS — M20.11 ACQUIRED HALLUX VALGUS OF RIGHT FOOT: ICD-10-CM

## 2019-02-15 LAB
ANION GAP SERPL CALCULATED.3IONS-SCNC: 9 MMOL/L (ref 5–14)
BASOPHILS # BLD AUTO: 0 THOUSANDS/ΜL (ref 0–0.1)
BASOPHILS NFR BLD AUTO: 0 % (ref 0–1)
BUN SERPL-MCNC: 15 MG/DL (ref 5–25)
CALCIUM SERPL-MCNC: 9.9 MG/DL (ref 8.4–10.2)
CHLORIDE SERPL-SCNC: 100 MMOL/L (ref 97–108)
CO2 SERPL-SCNC: 30 MMOL/L (ref 22–30)
CREAT SERPL-MCNC: 0.77 MG/DL (ref 0.6–1.2)
EOSINOPHIL # BLD AUTO: 0.2 THOUSAND/ΜL (ref 0–0.4)
EOSINOPHIL NFR BLD AUTO: 2 % (ref 0–6)
ERYTHROCYTE [DISTWIDTH] IN BLOOD BY AUTOMATED COUNT: 13.4 %
GFR SERPL CREATININE-BSD FRML MDRD: 80 ML/MIN/1.73SQ M
GLUCOSE SERPL-MCNC: 90 MG/DL (ref 70–99)
HCT VFR BLD AUTO: 46.3 % (ref 36–46)
HGB BLD-MCNC: 15 G/DL (ref 12–16)
LYMPHOCYTES # BLD AUTO: 2.9 THOUSANDS/ΜL (ref 0.5–4)
LYMPHOCYTES NFR BLD AUTO: 33 % (ref 25–45)
MCH RBC QN AUTO: 31.3 PG (ref 26–34)
MCHC RBC AUTO-ENTMCNC: 32.4 G/DL (ref 31–36)
MCV RBC AUTO: 97 FL (ref 80–100)
MONOCYTES # BLD AUTO: 0.6 THOUSAND/ΜL (ref 0.2–0.9)
MONOCYTES NFR BLD AUTO: 7 % (ref 1–10)
NEUTROPHILS # BLD AUTO: 5.1 THOUSANDS/ΜL (ref 1.8–7.8)
NEUTS SEG NFR BLD AUTO: 58 % (ref 45–65)
PLATELET # BLD AUTO: 270 THOUSANDS/UL (ref 150–450)
PMV BLD AUTO: 9.5 FL (ref 8.9–12.7)
POTASSIUM SERPL-SCNC: 4 MMOL/L (ref 3.6–5)
RBC # BLD AUTO: 4.78 MILLION/UL (ref 4–5.2)
SODIUM SERPL-SCNC: 139 MMOL/L (ref 137–147)
WBC # BLD AUTO: 8.8 THOUSAND/UL (ref 4.5–11)

## 2019-02-15 PROCEDURE — 80048 BASIC METABOLIC PNL TOTAL CA: CPT

## 2019-02-15 PROCEDURE — 71046 X-RAY EXAM CHEST 2 VIEWS: CPT

## 2019-02-15 PROCEDURE — 36415 COLL VENOUS BLD VENIPUNCTURE: CPT

## 2019-02-15 PROCEDURE — 85025 COMPLETE CBC W/AUTO DIFF WBC: CPT

## 2019-02-21 ENCOUNTER — TELEPHONE (OUTPATIENT)
Dept: PSYCHIATRY | Facility: CLINIC | Age: 69
End: 2019-02-21

## 2019-02-21 NOTE — TELEPHONE ENCOUNTER
Pt called/stated insurance requires a PA for zolpidem ER 6 25mg tablets  Pt provided phone # for ins company:1- [phone]    Pt would like to be called when decision has been made and can be reached at [phone]

## 2019-02-22 ENCOUNTER — TELEPHONE (OUTPATIENT)
Dept: PSYCHIATRY | Facility: CLINIC | Age: 69
End: 2019-02-22

## 2019-02-22 DIAGNOSIS — G47.09 OTHER INSOMNIA: Primary | Chronic | ICD-10-CM

## 2019-02-22 NOTE — TELEPHONE ENCOUNTER
Called John Hoffman and discussed denial of prior auth by her insurance company for Zolpidem ER and discussed suggested (by her insurance) formulary alternatives  She stated that she is allergic to Trazodone (it is on her allergy list ) She will check with her pharmacist what Kirill Toddd will cost (co-pay) and will contact nursing back  For Dr Carmel Gomez review

## 2019-02-22 NOTE — TELEPHONE ENCOUNTER
I spoke with the pharmacist at [facility] to verify need for P A and insurance information  Prescription benefits with [facility] D; Shu Salazar [id number] [facility] [id number]; ID [id number]; [phone]  Suggested alternatives listed as Silenor, trazodone or Belsomra       Dr Halle Leach - could you provide information/ rationale for request? Thanks

## 2019-02-25 NOTE — TELEPHONE ENCOUNTER
Nikko Cardona had left a message Friday  She checked on the out of pocket cost for Belsomra - it would be less than $4 00  She is okay with a trial of Belsomra  I spoke with Nikko Cardona and confirmed her message  Prescription could be e-scribed to [facility] in EHR and she gets a notification when a prescription is ready for   She asked if there were specific instructions to start medication  She was instructed to not take both together and we agreed I will call her if there are further instructions, otherwise she will expect notification from pharmacy when prescription ready

## 2019-03-04 NOTE — PRE-PROCEDURE INSTRUCTIONS
Pre-Surgery Instructions:   Medication Instructions    albuterol (VENTOLIN HFA) 90 mcg/act inhaler Instructed patient per Anesthesia Guidelines   ASMANEX 120 METERED DOSES 220 MCG/INH inhaler Instructed patient per Anesthesia Guidelines   DULoxetine (CYMBALTA) 60 mg delayed release capsule Instructed patient per Anesthesia Guidelines   LORazepam (ATIVAN) 1 mg tablet Instructed patient per Anesthesia Guidelines   verapamil (CALAN) 80 mg tablet Instructed patient per Anesthesia Guidelines  Pre-op Showering Instructions for Surgery Patients    Before your operation, you play an important role in decreasing your risk for infection by washing with special antiseptic soap  This is an effective way to reduce bacteria on the skin which may help to prevent infections at the surgical site  Please read the following directions in advance  1  In the week before your operation, purchase a 4 ounce bottle of antiseptic soap containing chlorhexidine gluconate (CHG)  4%  Some brand names include: Aplicare®, Endure, and Hibiclens®  The cost is usually less than $5 00   For your convenience, the [employer] carries the soap   It may also be available at your doctors office or pre-admission testing center, and at most retail pharmacies   If you are allergic or sensitive to soaps containing CHG, please let your doctor know so another antiseptic can be suggested   CHG antiseptic soap is for external use only  2   The day before your operation, follow these instructions carefully to get ready   Please clean linens (sheets) on your bed; you should sleep on clean sheets after your evening shower   Get clean towels and washcloth ready - you need enough for 2 showers   Set aside clean underwear, pajamas, and clothing to wear after the showers     Reminders:   DO NOT use any other soap or body rinse on your skin during or after the antiseptic showers   DO NOT use lotion, powder, deodorant, or perfume/aftershave of any kind on your skin after your antiseptic shower   DO NOT shave any body parts in the 24 hours/day before your operation   DO NOT get the antiseptic soap in your eyes, ears, nose, mouth, or vaginal area    3  You will need to shower the night before AND the morning of your surgery  Shower 1:   The first evening before the operation, take the first shower   First, shampoo your hair with regular shampoo and rinse it completely before you use the antiseptic soap  After washing and rinsing your hair, rinse your body   Next, use a clean washcloth to apply the antiseptic soap and wash your body from the neck down to your toes using ½ bottle of the antiseptic soap   You will use the other ½ bottle for the second shower   Clean the area where your incision will be; lather this area well for about 2 minutes   If you are having head or neck surgery, wash areas with the antiseptic soap   Rinse yourself completely with running water   Use a clean towel to dry off   Wear clean underwear and clothing/pajamas  Shower 2   The morning of your operation, take the second shower following the same steps as Shower 1 using the second ½ of the bottle of antiseptic soap   Use clean cloths and towels to wash and dry yourself   Wear clean underwear and clothing

## 2019-03-05 ENCOUNTER — ANESTHESIA EVENT (OUTPATIENT)
Dept: PERIOP | Facility: HOSPITAL | Age: 69
End: 2019-03-05
Payer: COMMERCIAL

## 2019-03-05 NOTE — ANESTHESIA PREPROCEDURE EVALUATION
Review of Systems/Medical History  Patient summary reviewed  Chart reviewed  No history of anesthetic complications     Cardiovascular  Exercise tolerance (METS): <4,  Hyperlipidemia,    Pulmonary  Smoker ex-smoker  Cumulative Pack Years: 25, Asthma , well controlled/ stable , No sleep apnea ,        GI/Hepatic  Negative GI/hepatic ROS          Negative  ROS        Endo/Other    Obesity  morbid obesity and super morbid obesity   GYN  , Prior pregnancy/OB history : 2 Parity: 2,     Comment: postmenopausal     Hematology  Anemia anemia of chronic disease,     Musculoskeletal    Comment: Elbow replacement Arthritis (y)     Neurology    Headaches,    Psychology   Anxiety, Depression , being treated for depression,              Physical Exam    Airway    Mallampati score: II  TM Distance: >3 FB  Neck ROM: full     Dental       Cardiovascular  Cardiovascular exam normal    Pulmonary  Pulmonary exam normal     Other Findings  Fixed upper and lower teeth and in good repair      Anesthesia Plan  ASA Score- 3     Anesthesia Type- IV sedation with anesthesia with ASA Monitors  Additional Monitors:   Airway Plan:         Plan Factors-Patient not instructed to abstain from smoking on day of procedure  Patient did not smoke on day of surgery  Induction- intravenous  Postoperative Plan- Plan for postoperative opioid use  Informed Consent- Anesthetic plan and risks discussed with patient and sibling  I personally reviewed this patient with the CRNA  Discussed and agreed on the Anesthesia Plan with the CRNA  Hai Owens

## 2019-03-06 ENCOUNTER — APPOINTMENT (OUTPATIENT)
Dept: RADIOLOGY | Facility: HOSPITAL | Age: 69
End: 2019-03-06
Payer: COMMERCIAL

## 2019-03-06 ENCOUNTER — HOSPITAL ENCOUNTER (OUTPATIENT)
Facility: HOSPITAL | Age: 69
Setting detail: OUTPATIENT SURGERY
Discharge: HOME | End: 2019-03-06
Attending: PODIATRIST | Admitting: PODIATRIST
Payer: COMMERCIAL

## 2019-03-06 ENCOUNTER — ANESTHESIA (OUTPATIENT)
Dept: PERIOP | Facility: HOSPITAL | Age: 69
End: 2019-03-06
Payer: COMMERCIAL

## 2019-03-06 VITALS
HEART RATE: 61 BPM | WEIGHT: 234 LBS | SYSTOLIC BLOOD PRESSURE: 136 MMHG | DIASTOLIC BLOOD PRESSURE: 63 MMHG | TEMPERATURE: 98.3 F | BODY MASS INDEX: 41.46 KG/M2 | RESPIRATION RATE: 16 BRPM | HEIGHT: 63 IN | OXYGEN SATURATION: 96 %

## 2019-03-06 DIAGNOSIS — Z98.890 S/P FOOT SURGERY, LEFT: Primary | ICD-10-CM

## 2019-03-06 PROCEDURE — 97116 GAIT TRAINING THERAPY: CPT

## 2019-03-06 PROCEDURE — 73620 X-RAY EXAM OF FOOT: CPT

## 2019-03-06 PROCEDURE — C1713 ANCHOR/SCREW BN/BN,TIS/BN: HCPCS | Performed by: PODIATRIST

## 2019-03-06 PROCEDURE — 73630 X-RAY EXAM OF FOOT: CPT

## 2019-03-06 DEVICE — CANNULATED SCREW
Type: IMPLANTABLE DEVICE | Site: FOOT | Status: FUNCTIONAL
Brand: ASNIS

## 2019-03-06 DEVICE — TWIST-OFF SCREW
Type: IMPLANTABLE DEVICE | Status: FUNCTIONAL
Brand: FIXOS

## 2019-03-06 RX ORDER — SODIUM CHLORIDE, SODIUM LACTATE, POTASSIUM CHLORIDE, CALCIUM CHLORIDE 600; 310; 30; 20 MG/100ML; MG/100ML; MG/100ML; MG/100ML
INJECTION, SOLUTION INTRAVENOUS CONTINUOUS PRN
Status: DISCONTINUED | OUTPATIENT
Start: 2019-03-06 | End: 2019-03-06 | Stop reason: SURG

## 2019-03-06 RX ORDER — KETAMINE HYDROCHLORIDE 50 MG/ML
INJECTION, SOLUTION, CONCENTRATE INTRAMUSCULAR; INTRAVENOUS AS NEEDED
Status: DISCONTINUED | OUTPATIENT
Start: 2019-03-06 | End: 2019-03-06 | Stop reason: SURG

## 2019-03-06 RX ORDER — OXYCODONE HYDROCHLORIDE AND ACETAMINOPHEN 5; 325 MG/1; MG/1
1 TABLET ORAL EVERY 4 HOURS PRN
Status: DISCONTINUED | OUTPATIENT
Start: 2019-03-06 | End: 2019-03-06 | Stop reason: HOSPADM

## 2019-03-06 RX ORDER — DEXAMETHASONE SODIUM PHOSPHATE 4 MG/ML
4 INJECTION, SOLUTION INTRA-ARTICULAR; INTRALESIONAL; INTRAMUSCULAR; INTRAVENOUS; SOFT TISSUE ONCE AS NEEDED
Status: DISCONTINUED | OUTPATIENT
Start: 2019-03-06 | End: 2019-03-06 | Stop reason: HOSPADM

## 2019-03-06 RX ORDER — PROPOFOL 10 MG/ML
INJECTION, EMULSION INTRAVENOUS CONTINUOUS PRN
Status: DISCONTINUED | OUTPATIENT
Start: 2019-03-06 | End: 2019-03-06 | Stop reason: SURG

## 2019-03-06 RX ORDER — DIPHENHYDRAMINE HYDROCHLORIDE 50 MG/ML
12.5 INJECTION INTRAMUSCULAR; INTRAVENOUS ONCE
Status: DISCONTINUED | OUTPATIENT
Start: 2019-03-06 | End: 2019-03-06 | Stop reason: HOSPADM

## 2019-03-06 RX ORDER — FENTANYL CITRATE 50 UG/ML
INJECTION, SOLUTION INTRAMUSCULAR; INTRAVENOUS AS NEEDED
Status: DISCONTINUED | OUTPATIENT
Start: 2019-03-06 | End: 2019-03-06 | Stop reason: SURG

## 2019-03-06 RX ORDER — MIDAZOLAM HYDROCHLORIDE 1 MG/ML
INJECTION INTRAMUSCULAR; INTRAVENOUS AS NEEDED
Status: DISCONTINUED | OUTPATIENT
Start: 2019-03-06 | End: 2019-03-06 | Stop reason: SURG

## 2019-03-06 RX ORDER — KETOROLAC TROMETHAMINE 30 MG/ML
INJECTION, SOLUTION INTRAMUSCULAR; INTRAVENOUS AS NEEDED
Status: DISCONTINUED | OUTPATIENT
Start: 2019-03-06 | End: 2019-03-06 | Stop reason: SURG

## 2019-03-06 RX ORDER — FENTANYL CITRATE/PF 50 MCG/ML
12.5 SYRINGE (ML) INJECTION
Status: DISCONTINUED | OUTPATIENT
Start: 2019-03-06 | End: 2019-03-06 | Stop reason: HOSPADM

## 2019-03-06 RX ORDER — FENTANYL CITRATE/PF 50 MCG/ML
25 SYRINGE (ML) INJECTION
Status: DISCONTINUED | OUTPATIENT
Start: 2019-03-06 | End: 2019-03-06 | Stop reason: HOSPADM

## 2019-03-06 RX ORDER — CLINDAMYCIN PHOSPHATE 600 MG/50ML
600 INJECTION INTRAVENOUS ONCE
Status: DISCONTINUED | OUTPATIENT
Start: 2019-03-06 | End: 2019-03-06 | Stop reason: HOSPADM

## 2019-03-06 RX ORDER — SODIUM CHLORIDE, SODIUM LACTATE, POTASSIUM CHLORIDE, CALCIUM CHLORIDE 600; 310; 30; 20 MG/100ML; MG/100ML; MG/100ML; MG/100ML
75 INJECTION, SOLUTION INTRAVENOUS CONTINUOUS
Status: DISCONTINUED | OUTPATIENT
Start: 2019-03-06 | End: 2019-03-06 | Stop reason: HOSPADM

## 2019-03-06 RX ORDER — CLINDAMYCIN PHOSPHATE 600 MG/50ML
INJECTION INTRAVENOUS AS NEEDED
Status: DISCONTINUED | OUTPATIENT
Start: 2019-03-06 | End: 2019-03-06 | Stop reason: SURG

## 2019-03-06 RX ORDER — MAGNESIUM HYDROXIDE 1200 MG/15ML
LIQUID ORAL AS NEEDED
Status: DISCONTINUED | OUTPATIENT
Start: 2019-03-06 | End: 2019-03-06 | Stop reason: HOSPADM

## 2019-03-06 RX ADMIN — KETAMINE HYDROCHLORIDE 10 MG: 50 INJECTION, SOLUTION INTRAMUSCULAR; INTRAVENOUS at 07:38

## 2019-03-06 RX ADMIN — FENTANYL CITRATE 25 MCG: 50 INJECTION INTRAMUSCULAR; INTRAVENOUS at 07:41

## 2019-03-06 RX ADMIN — CLINDAMYCIN IN 5 PERCENT DEXTROSE 600 MG: 12 INJECTION, SOLUTION INTRAVENOUS at 07:40

## 2019-03-06 RX ADMIN — KETOROLAC TROMETHAMINE 30 MG: 30 INJECTION, SOLUTION INTRAMUSCULAR; INTRAVENOUS at 07:51

## 2019-03-06 RX ADMIN — SODIUM CHLORIDE, SODIUM LACTATE, POTASSIUM CHLORIDE, AND CALCIUM CHLORIDE: .6; .31; .03; .02 INJECTION, SOLUTION INTRAVENOUS at 07:05

## 2019-03-06 RX ADMIN — PROPOFOL 120 MCG/KG/MIN: 10 INJECTION, EMULSION INTRAVENOUS at 07:37

## 2019-03-06 RX ADMIN — FENTANYL CITRATE 25 MCG: 50 INJECTION INTRAMUSCULAR; INTRAVENOUS at 07:37

## 2019-03-06 RX ADMIN — SODIUM CHLORIDE, SODIUM LACTATE, POTASSIUM CHLORIDE, AND CALCIUM CHLORIDE 75 ML/HR: .6; .31; .03; .02 INJECTION, SOLUTION INTRAVENOUS at 06:30

## 2019-03-06 RX ADMIN — MIDAZOLAM HYDROCHLORIDE 2 MG: 1 INJECTION, SOLUTION INTRAMUSCULAR; INTRAVENOUS at 07:36

## 2019-03-06 NOTE — DISCHARGE INSTRUCTIONS
Dr Orlando Valente operative instructions    1  Take your prescribed medication as directed  2  Upon arrival at home, lie down and elevate your surgical foot on 2 pillows  3  Remain quiet, off your feet as much as possible, for the first 24-48 hours  This is when your feet first swell and may become painful  After 48 hours you may begin limited walking following these restrictions:   Weightbear as tolerated to surgical foot in surgical shoe with assistance ( crutches vs walker )  4  Drink large quantities of water and citrus fruit juice  Consume no alcohol  Continue a well-balanced diet  5  Report any unusual discomfort or fever to this office  6  A limited amount of discomfort and swelling is to be expected  In some cases the skin may take on a bruised appearance  The surgical solution that was applied to your foot prior to the operation is dark in color and the operation site may appear to be oozing when it actually is not  7  A slight amount of blood is to be expected, and is no cause for alarm  Do not remove the dressings  If there is active bleeding and if the bleeding persists, add additional gauze to the bandage, apply direct pressure, elevate your feet and call this office  8  Do not get the dressings wet  As regular bathing may be inconvenient, sponge baths are recommended  9  When anesthesia wears off and if any discomfort should be present, apply an ice pack directly over the operated area for 15 minute intervals for several hours or until the pain leaves  (USE IN EXCESS OF 15 MINUTES COULD CAUSE FROSTBITE)  Do not use hot water bags or electric pads  A convenient icepack can be made by placing ice cubes in a plastic bag and covering this with a towel  10  If necessary, take a mild laxative before retiring  11  Wear your special open shoes anytime you put weight on your foot, even if it is just to walk to the bathroom and back   It will probably be 2 or 3 weeks before you will be permitted to try regular shoes  12  Having performed the operation, we are interested in a prompt recovery  Please cooperate by following the above instructions  13  Please call to confirm your post-op appointment or call with any other questions

## 2019-03-06 NOTE — NURSING NOTE
Patient out of bed to commode chair  Voided  Toes warm/good capillary refill noted  Small amount drainage noted at OP site  Assisted back to bed with leg elevation

## 2019-03-06 NOTE — PHYSICAL THERAPY NOTE
[facility] PT Note    Time In: 7499  Time Out::1132     Patient Name: Queen Cooks    Patient : 1950    Physician Name:  Dr Jai Enriquez DPM    Physician Order / [facility] Status: 3/6/19 PT Eval and Treat/ WBAT LLE with walker    Procedure: 3/6/19 Bunion repair, hammer toe # 2, weil osteotomy left due to acquired hallux valgus of left foot, hammer toe left foot, enthesopathy left foot      Pt seen post op  Pt fit for RW  Pt demonstrates safe ambulation on level surfaces with RW  VC for step to sequence and on pushing from surface for sit to stand and not RW  Pt has elevator access, no steps  Pt was instructed in curb step if she would encounter if out of home  Family/Caregiver was present during training  Pt/family demonstrates understanding of instruction provided      THOMAS maya-  [id number],[facility] E, PT

## 2019-03-06 NOTE — OP NOTE
OPERATIVE REPORT  PATIENT NAME: Issa Moyer    :  1950  MRN: [id number]  Pt Location:  OR ROOM 12    SURGERY DATE: 3/6/2019    Surgeon(s) and Role:     * Babs Arizmendi DPM - Primary     * Bautista Fuentes DPM - Assisting    Preop Diagnosis:  Acquired hallux valgus of left foot [M20 12]  Other hammer toe(s) (acquired), left foot [M20 42]  Other enthesopathy of left foot [M77 52]    Post-Op Diagnosis Codes:     * Acquired hallux valgus of left foot [M20 12]     * Other hammer toe(s) (acquired), left foot [M20 42]     * Other enthesopathy of left foot [M77 52]    Procedure(s) (LRB):  BUNION REPAIR, HAMMER TOE #2, WEIL OSTEOTOMY (Left)  Use of fluoroscopy    Specimen(s):  * No specimens in log *    Hemostasis:  PNAT at 250mmHg for 54 mins    Estimated Blood Loss:   Minimal    Materials:  3-0 vicryl  4-0 vicryl  4-0 nylon  Husam Micro Asnis 3 0 x 16mm screw x2  Husam Snap off 2 0x12mm screw      Drains:  * No LDAs found *    Anesthesia Type:   IV Sedation with Anesthesia    Operative Indications:  Acquired hallux valgus of left foot [M20 12]  Other hammer toe(s) (acquired), left foot [M20 42]  Other enthesopathy of left foot [M77 52]      Operative Findings:  C/w diagnosis  Adequate reduction/correction noted clinically and radiographically    Complications:   None    Procedure and Technique:  Under mild sedation, the patient was brought into the operating room and placed on the operating room table in the supine position  A pneumatic ankle tourniquet was then placed around the patient's left ankle with ample webril padding  A time out was performed to confirm the correct patient, procedure and site with all parties in agreement  Following IV sedation, local anesthetic was obtained about the patient's foot was performed consisting of 20 ml of 1% Lidocaine and 0 5% Bupivacaine in a 1:1 mixture  The foot was then scrubbed, prepped and draped in the usual aseptic manner   An esmarch bandage was utilized to exsangunate the patients foot and the pneumatic ankle tourniquet was then inflated  The esmarch bandage was removed and the foot was placed on the operating room table  Attention was then directed to the dorsal aspect of the first metatarsal where an approximately 6 cm linear incision was made  The incision was deepened through the subcutaneous tissues using sharp and blunt dissection  Care was taken to identify and retract all vital neural and vascular structures  All bleeders were cauterized and ligated as necessary  A capsuloptomy was performed over the dorsal aspect of the MPJ  The periosteal and capsular structures were then carefully dissected free of their osseous attachments and reflected medially and laterally, thus exposing the head of the first metatarsal at the operative site  Attention was then directed to the 1st interspace via the original skin incision where the dissection was continued deep using sharp dissection down to the level of the fibular sesamoid which was free from its soft tissue attachments proximally, laterally and distally  The conjoined tendon of the adductor halluces was then identified and transected at its attachment and tagged with suture  At this time the lateral contraction presents on the hallux was noted to be reduced and the sesamoid apparatus was noted to float into a more corrected medial position  Attention was then directed to the first met head where the medial prominence was resected by the sagittal bone saw  A through and through V type osteotomy was made at a 60 degree angle  This cut was created in the metataphyseal region of the bone utilizing a sagittal bone saw and the apices of this osteotomy pointing proximal plantarly and proximal dorsally  Upon completion of this osteotomy, the capital fragment was distracted and shifted laterally into a more corrected position and impacted onto the shaft of the first met   K wires were used as temp fixation across the osteotomy site  With proper AO technique 2 shubham micro asnis screws serve as fixation across the osteotomy site  Correction of the deformity was assessed at this time and noted to be adequate  Attention was then directed to the left 2nd toe  A hammertoe deformity was present  A  dorsal linear incision was made from the proximal metatarsal to the proximal interphalangeal joint  The incision was then deepened via sharp dissection through the subcutaneous tissues, ligating all venous vessels as necessary  Dissection was carried to the level of the EDL tendon  The tendon was then transected at that level  The PIPJ was then exposed and the medial and lateral collateral ligaments were incised to expose the head of the proximal phalanx  By use of bone cutter, the head of the proximal phalanx was resected  Push test showed that the contracture was resolved     Attention was then directed to the 2nd MPJ  All bleeders were ligated as necessary  The extensor tendon was retracted and protected  A longitudinal capsulotomy was performed exposing met head and by use of sharp dissection the metatarsal head was freed from its soft tissue structures  A Weil osteotomy was then performed with sagittal saw and the osteotomy site was fixated with 2 0 x 12 mm snap-off screws  Correction was noted clinically and radiographically  At this time, digits 3-5 noted to sit in rectus position and no need for hammertoe correction was needed  The wound was then flushed with copious amounts of sterile saline  The extensor tendon was reapproximated using 4-0 vicryl  The periosteal and capsular structures were reapproximated using 3-0 Vicryl  The subQ tissues were reapproximated using 4-0 Vicryl and the subcuticular closure was obtained using 4-0 Monocril  The incision site was then dressed with steristrips, adaptic, Dry sterile dressing   The pneumatic ankle tourniquet was deflated and normal hyperemic flush was noted to all digits  The patient tolerated the procedure and anesthesia well and was transported to the PACU with vital signs stable         Patient Disposition:  PACU     SIGNATURE: Flo Henson DPM  DATE: March 6, 2019  TIME: 9:01 AM

## 2019-03-06 NOTE — DISCHARGE SUMMARY
Discharge Summary Outpatient Procedure Podiatry -   Josesito Gonzalez 81 y o  female MRN: [id number]  Unit/Bed#: OR POOL Encounter: [id number]    Admission Date: 3/6/2019     Admitting Diagnosis: Acquired hallux valgus of left foot [M20 12]  Other hammer toe(s) (acquired), left foot [M20 42]  Other enthesopathy of left foot [M77 52]    Discharge Diagnosis: same    Procedures Performed: BUNION REPAIR, HAMMER TOE #2, WEIL OSTEOTOMY: 14222 (CPT®)    Complications: none    Condition at Discharge: stable    Discharge instructions/Information to patient and family:   See after visit summary for information provided to patient and family  Provisions for Follow-Up Care/Important appointments:  See after visit summary for information related to follow-up care and any pertinent home health orders  Discharge Medications:  See after visit summary for reconciled discharge medications provided to patient and family

## 2019-03-06 NOTE — INTERVAL H&P NOTE
Chart reviewed and pt states no new changes in health  No prior anesthesia  issues  No questions voiced  Pt re consents to procedure  Old chart reviewed from [facility]

## 2019-03-06 NOTE — ANESTHESIA POSTPROCEDURE EVALUATION
Post-Op Assessment Note    CV Status:  Stable  Pain Score: 0    Pain management: adequate     Mental Status:  Alert and awake   Hydration Status:  Euvolemic   PONV Controlled:  Controlled   Airway Patency:  Patent   Post Op Vitals Reviewed: Yes      Staff: Anesthesiologist, CRNA           BP      Temp (!) 97 °F (36 1 °C) (03/06/19 0900)    Pulse 67 (03/06/19 0900)   Resp 20 (03/06/19 0900)    SpO2 97 % (03/06/19 0900)

## 2019-03-07 ENCOUNTER — DOCUMENTATION (OUTPATIENT)
Dept: PSYCHIATRY | Facility: CLINIC | Age: 69
End: 2019-03-07

## 2019-03-07 NOTE — PROGRESS NOTES
Nursing called patient regarding a request for prior auth from [facility] [employer] [facility] for Zolpidem CR  It was noted that patient was ordered Belsomra and patient reported that this new medication is effective and told nursing that at this time she will not need to proceed with prior authorization for this Zolpidem CR at this time  Nursing did call [employer] prior authorization line and initiated a prior authorization for Lorazepam 1 mg  Reference number [id number]  Will await outcome of prior auth

## 2019-03-08 ENCOUNTER — TELEPHONE (OUTPATIENT)
Dept: PSYCHIATRY | Facility: CLINIC | Age: 69
End: 2019-03-08

## 2019-03-08 NOTE — TELEPHONE ENCOUNTER
Fax [place] from [facility] [place] for Lorazepam 1 mg tab approved 3/8/19 until 3/7/2020  Paperwork received by Adriane Tejeda and Dr Gwen Kelly from [facility] (requesting prior auth) indicated that Adriane Tejeda would receive a "temporary supply" but did not indicate number of tablets she would receive  Spoke with Adriane Tejeda and informed her of prior auth approval      Spoke with Gaurav Castillo, pharmacist at [facility] to inform of prior auth approval  She stated that Adriane Tejeda received 120 tabs lorazepam  1 mg on 2/22/19 at [facility]        For Dr Suha carvalho

## 2019-03-14 ENCOUNTER — HOSPITAL ENCOUNTER (OUTPATIENT)
Dept: RADIOLOGY | Facility: HOSPITAL | Age: 69
Discharge: HOME | End: 2019-03-14
Attending: PODIATRIST
Payer: COMMERCIAL

## 2019-03-14 DIAGNOSIS — M20.12 ACQUIRED HALLUX VALGUS OF LEFT FOOT: ICD-10-CM

## 2019-03-14 DIAGNOSIS — M20.11 ACQUIRED HALLUX VALGUS OF RIGHT FOOT: ICD-10-CM

## 2019-03-14 PROCEDURE — 73630 X-RAY EXAM OF FOOT: CPT

## 2019-03-28 ENCOUNTER — HOSPITAL ENCOUNTER (OUTPATIENT)
Dept: RADIOLOGY | Facility: HOSPITAL | Age: 69
Discharge: HOME | End: 2019-03-28
Attending: PODIATRIST
Payer: COMMERCIAL

## 2019-03-28 ENCOUNTER — TRANSCRIBE ORDERS (OUTPATIENT)
Dept: ADMINISTRATIVE | Facility: HOSPITAL | Age: 69
End: 2019-03-28

## 2019-03-28 DIAGNOSIS — M20.11 ACQUIRED HALLUX VALGUS OF RIGHT FOOT: Primary | ICD-10-CM

## 2019-03-28 DIAGNOSIS — M20.11 ACQUIRED HALLUX VALGUS OF RIGHT FOOT: ICD-10-CM

## 2019-03-28 DIAGNOSIS — M20.12 ACQUIRED HALLUX VALGUS OF LEFT FOOT: ICD-10-CM

## 2019-03-28 PROCEDURE — 73630 X-RAY EXAM OF FOOT: CPT

## 2019-04-04 ENCOUNTER — TELEPHONE (OUTPATIENT)
Dept: PSYCHIATRY | Facility: CLINIC | Age: 69
End: 2019-04-04

## 2019-04-04 DIAGNOSIS — F41.1 GAD (GENERALIZED ANXIETY DISORDER): Chronic | ICD-10-CM

## 2019-04-04 DIAGNOSIS — F31.76 BIPOLAR I DISORDER, MOST RECENT EPISODE DEPRESSED, IN FULL REMISSION (HCC): Primary | Chronic | ICD-10-CM

## 2019-04-04 RX ORDER — DULOXETIN HYDROCHLORIDE 60 MG/1
120 CAPSULE, DELAYED RELEASE ORAL DAILY
Qty: 60 CAPSULE | Refills: 1 | Status: SHIPPED | OUTPATIENT
Start: 2019-04-04 | End: 2019-05-30 | Stop reason: SDUPTHER

## 2019-04-18 DIAGNOSIS — F31.76 BIPOLAR I DISORDER, MOST RECENT EPISODE DEPRESSED, IN FULL REMISSION (HCC): Primary | Chronic | ICD-10-CM

## 2019-04-19 RX ORDER — ARIPIPRAZOLE 15 MG/1
15 TABLET ORAL
Qty: 30 TABLET | Refills: 1 | Status: SHIPPED | OUTPATIENT
Start: 2019-04-19 | End: 2019-05-30 | Stop reason: SDUPTHER

## 2019-04-25 DIAGNOSIS — G47.09 OTHER INSOMNIA: Chronic | ICD-10-CM

## 2019-05-14 ENCOUNTER — TRANSCRIBE ORDERS (OUTPATIENT)
Dept: ADMINISTRATIVE | Facility: HOSPITAL | Age: 69
End: 2019-05-14

## 2019-05-14 ENCOUNTER — HOSPITAL ENCOUNTER (OUTPATIENT)
Dept: RADIOLOGY | Facility: HOSPITAL | Age: 69
Discharge: HOME | End: 2019-05-14
Attending: PODIATRIST
Payer: COMMERCIAL

## 2019-05-14 DIAGNOSIS — M79.672 LEFT FOOT PAIN: ICD-10-CM

## 2019-05-14 DIAGNOSIS — M79.672 LEFT FOOT PAIN: Primary | ICD-10-CM

## 2019-05-14 PROCEDURE — 73630 X-RAY EXAM OF FOOT: CPT

## 2019-05-17 DIAGNOSIS — F31.76 BIPOLAR I DISORDER, MOST RECENT EPISODE DEPRESSED, IN FULL REMISSION (HCC): Chronic | ICD-10-CM

## 2019-05-17 RX ORDER — ARIPIPRAZOLE 15 MG/1
15 TABLET ORAL
Qty: 30 TABLET | Refills: 0 | OUTPATIENT
Start: 2019-05-17 | End: 2019-07-16

## 2019-05-24 ENCOUNTER — TELEPHONE (OUTPATIENT)
Dept: PSYCHIATRY | Facility: CLINIC | Age: 69
End: 2019-05-24

## 2019-05-24 DIAGNOSIS — G47.09 OTHER INSOMNIA: Primary | Chronic | ICD-10-CM

## 2019-05-28 ENCOUNTER — DOCUMENTATION (OUTPATIENT)
Dept: PSYCHIATRY | Facility: CLINIC | Age: 69
End: 2019-05-28

## 2019-05-30 ENCOUNTER — OFFICE VISIT (OUTPATIENT)
Dept: PSYCHIATRY | Facility: CLINIC | Age: 69
End: 2019-05-30
Payer: COMMERCIAL

## 2019-05-30 VITALS
BODY MASS INDEX: 41.46 KG/M2 | HEART RATE: 82 BPM | HEIGHT: 63 IN | DIASTOLIC BLOOD PRESSURE: 81 MMHG | WEIGHT: 234 LBS | SYSTOLIC BLOOD PRESSURE: 144 MMHG

## 2019-05-30 DIAGNOSIS — F60.9 PERSONALITY DISORDER (HCC): Chronic | ICD-10-CM

## 2019-05-30 DIAGNOSIS — G47.09 OTHER INSOMNIA: Chronic | ICD-10-CM

## 2019-05-30 DIAGNOSIS — F31.76 BIPOLAR I DISORDER, MOST RECENT EPISODE DEPRESSED, IN FULL REMISSION (HCC): Primary | Chronic | ICD-10-CM

## 2019-05-30 DIAGNOSIS — F43.12 POST-TRAUMATIC STRESS DISORDER, CHRONIC: Chronic | ICD-10-CM

## 2019-05-30 DIAGNOSIS — F41.1 GAD (GENERALIZED ANXIETY DISORDER): Chronic | ICD-10-CM

## 2019-05-30 DIAGNOSIS — G25.9 EXTRAPYRAMIDAL REACTION: Chronic | ICD-10-CM

## 2019-05-30 PROCEDURE — 90833 PSYTX W PT W E/M 30 MIN: CPT | Performed by: PSYCHIATRY & NEUROLOGY

## 2019-05-30 PROCEDURE — 99213 OFFICE O/P EST LOW 20 MIN: CPT | Performed by: PSYCHIATRY & NEUROLOGY

## 2019-05-30 RX ORDER — ARIPIPRAZOLE 15 MG/1
15 TABLET ORAL
Qty: 30 TABLET | Refills: 4 | Status: SHIPPED | OUTPATIENT
Start: 2019-05-30 | End: 2019-09-30 | Stop reason: SDUPTHER

## 2019-05-30 RX ORDER — LORAZEPAM 1 MG/1
1 TABLET ORAL 2 TIMES DAILY
Qty: 120 TABLET | Refills: 4 | Status: SHIPPED | OUTPATIENT
Start: 2019-05-30 | End: 2019-09-30 | Stop reason: SDUPTHER

## 2019-05-30 RX ORDER — BENZTROPINE MESYLATE 0.5 MG/1
0.5 TABLET ORAL 2 TIMES DAILY PRN
Qty: 60 TABLET | Refills: 4 | Status: SHIPPED | OUTPATIENT
Start: 2019-05-30 | End: 2019-09-30 | Stop reason: SDUPTHER

## 2019-05-30 RX ORDER — DULOXETIN HYDROCHLORIDE 60 MG/1
120 CAPSULE, DELAYED RELEASE ORAL DAILY
Qty: 60 CAPSULE | Refills: 4 | Status: SHIPPED | OUTPATIENT
Start: 2019-05-30 | End: 2019-09-30 | Stop reason: SDUPTHER

## 2019-06-25 DIAGNOSIS — G47.09 OTHER INSOMNIA: Primary | Chronic | ICD-10-CM

## 2019-07-25 DIAGNOSIS — G47.09 OTHER INSOMNIA: Chronic | ICD-10-CM

## 2019-08-21 DIAGNOSIS — F31.76 BIPOLAR I DISORDER, MOST RECENT EPISODE DEPRESSED, IN FULL REMISSION (HCC): Chronic | ICD-10-CM

## 2019-08-21 RX ORDER — ARIPIPRAZOLE 15 MG/1
15 TABLET ORAL
Qty: 90 TABLET | Refills: 1 | OUTPATIENT
Start: 2019-08-21 | End: 2020-01-18

## 2019-09-22 DIAGNOSIS — F31.76 BIPOLAR I DISORDER, MOST RECENT EPISODE DEPRESSED, IN FULL REMISSION (HCC): Chronic | ICD-10-CM

## 2019-09-23 RX ORDER — ARIPIPRAZOLE 15 MG/1
15 TABLET ORAL
Qty: 90 TABLET | Refills: 1 | OUTPATIENT
Start: 2019-09-23 | End: 2020-02-20
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
## 2019-09-30 ENCOUNTER — OFFICE VISIT (OUTPATIENT)
Dept: PSYCHIATRY | Facility: CLINIC | Age: 69
End: 2019-09-30
Payer: COMMERCIAL

## 2019-09-30 VITALS
BODY MASS INDEX: 41.11 KG/M2 | WEIGHT: 232 LBS | HEART RATE: 91 BPM | SYSTOLIC BLOOD PRESSURE: 137 MMHG | DIASTOLIC BLOOD PRESSURE: 74 MMHG | HEIGHT: 63 IN

## 2019-09-30 DIAGNOSIS — F41.1 GAD (GENERALIZED ANXIETY DISORDER): Chronic | ICD-10-CM

## 2019-09-30 DIAGNOSIS — G25.9 EXTRAPYRAMIDAL REACTION: Chronic | ICD-10-CM

## 2019-09-30 DIAGNOSIS — G47.09 OTHER INSOMNIA: Chronic | ICD-10-CM

## 2019-09-30 DIAGNOSIS — F43.12 POST-TRAUMATIC STRESS DISORDER, CHRONIC: Chronic | ICD-10-CM

## 2019-09-30 DIAGNOSIS — F60.9 PERSONALITY DISORDER (HCC): Chronic | ICD-10-CM

## 2019-09-30 DIAGNOSIS — F31.76 BIPOLAR I DISORDER, MOST RECENT EPISODE DEPRESSED, IN FULL REMISSION (HCC): Primary | Chronic | ICD-10-CM

## 2019-09-30 PROBLEM — E88.81 METABOLIC SYNDROME: Status: ACTIVE | Noted: 2019-08-15

## 2019-09-30 PROBLEM — E88.810 METABOLIC SYNDROME: Status: ACTIVE | Noted: 2019-08-15

## 2019-09-30 PROCEDURE — 99213 OFFICE O/P EST LOW 20 MIN: CPT | Performed by: PSYCHIATRY & NEUROLOGY

## 2019-09-30 PROCEDURE — 90833 PSYTX W PT W E/M 30 MIN: CPT | Performed by: PSYCHIATRY & NEUROLOGY

## 2019-09-30 RX ORDER — LORAZEPAM 1 MG/1
1 TABLET ORAL 2 TIMES DAILY
Qty: 120 TABLET | Refills: 4 | Status: SHIPPED | OUTPATIENT
Start: 2019-10-27 | End: 2020-04-21 | Stop reason: SDUPTHER

## 2019-09-30 RX ORDER — ARIPIPRAZOLE 15 MG/1
15 TABLET ORAL
Qty: 30 TABLET | Refills: 4 | Status: SHIPPED | OUTPATIENT
Start: 2019-09-30 | End: 2020-03-26

## 2019-09-30 RX ORDER — BENZTROPINE MESYLATE 0.5 MG/1
0.5 TABLET ORAL 2 TIMES DAILY PRN
Qty: 60 TABLET | Refills: 4 | Status: SHIPPED | OUTPATIENT
Start: 2019-09-30 | End: 2020-05-06 | Stop reason: SDUPTHER

## 2019-09-30 RX ORDER — DULOXETIN HYDROCHLORIDE 60 MG/1
120 CAPSULE, DELAYED RELEASE ORAL DAILY
Qty: 60 CAPSULE | Refills: 4 | Status: SHIPPED | OUTPATIENT
Start: 2019-09-30 | End: 2020-05-06 | Stop reason: SDUPTHER

## 2019-09-30 NOTE — BH TREATMENT PLAN
TREATMENT PLAN (Medication Management Only)        [facility]    Name/Date of Birth/MRN:  Dilcia Abdi 81 y o  1950 MRN: [id number]  Date of Treatment Plan: September 30, 2019  Diagnosis/Diagnoses:   1  Bipolar I disorder, most recent episode depressed, in full remission ([facility] )    2  TATIANA (generalized anxiety disorder)    3  Post-traumatic stress disorder, chronic    4  Personality disorder ([facility] )    5  Other insomnia    6  Extrapyramidal reaction      Strengths/Personal Resources for Self-Care: "being helpful"  Area/Areas of need (in own words): "dieting"  1  Long Term Goal:   "decrease number of medications"  Target Date: 1 year - 9/30/2020  Person/Persons responsible for completion of goal: Petty Porch  2  Short Term Objective (s) - How will we reach this goal?:   A  Provider new recommended medication/dosage changes and/or continue medication(s): continue current medications as prescribed (Cymbalta, Abilify, Ativan, Belsomra and Cogentin)  B   "by managing medications"  C   N/A  Target Date: 4 months - 1/30/2020  Person/Persons Responsible for Completion of Goal: Petty Porch   Progress Towards Goals: stable  Treatment Modality: medication management every 4 months  Review due 90 to 120 days from date of this plan: 4 months - 1/30/2020  Expected length of service: maintenance  My Physician/PA/NP and I have developed this plan together and I agree to work on the goals and objectives  I understand the treatment goals that were developed for my treatment    Electronic Signatures: on file (unless signed below)    Karl Thayer MD 09/30/19

## 2019-10-21 ENCOUNTER — TELEPHONE (OUTPATIENT)
Dept: PSYCHIATRY | Facility: CLINIC | Age: 69
End: 2019-10-21

## 2019-10-21 NOTE — TELEPHONE ENCOUNTER
Patient left message stating that she is out of Lorazepam 1 mg at this time and was told by the pharmacist that she can not fill her next refill until 10/27-which matches our medication log  She stated that she took the medication as directed but has run out  She asked if you could resend the prescription

## 2019-10-21 NOTE — TELEPHONE ENCOUNTER
Elda Ramin has been filing Ativan Rx earlier each time since 5/30/19  She had a prescription issued 5/50/19 for 30 days with 4 RF  She filled it on 5/30/19 then 6/20/19, 7/25/19, 8/21/19 and 9/18/19  She should have filled it 5/30/19 then 6/29/19, 7/29/19, 8/28/19 and 9/27/19 with due date for the new Rx on 10/27/19 as issued at the last appointment  Prescription cannot be issued at this time - she should have leftover Ativan from previous refills  If she runs out of Ativan in the future - Ativan will have to be discontinued entirely  Please let her know

## 2020-02-05 ENCOUNTER — DOCUMENTATION (OUTPATIENT)
Dept: PSYCHIATRY | Facility: CLINIC | Age: 70
End: 2020-02-05

## 2020-02-05 NOTE — PROGRESS NOTES
Treatment Plan not completed within required time limits due to: Jason Bowels  canceled appointment  on 1/30/2020 ( pr staff, Radha Ricketts called and canceled her apt for today 1/30/20 at 2:30 she said she has no transportation to [place] )

## 2020-03-26 DIAGNOSIS — F31.76 BIPOLAR I DISORDER, MOST RECENT EPISODE DEPRESSED, IN FULL REMISSION (HCC): Primary | Chronic | ICD-10-CM

## 2020-03-26 RX ORDER — ARIPIPRAZOLE 15 MG/1
15 TABLET ORAL
Qty: 90 TABLET | Refills: 0 | Status: SHIPPED | OUTPATIENT
Start: 2020-03-26 | End: 2020-05-06 | Stop reason: SDUPTHER

## 2020-04-05 DIAGNOSIS — G47.09 OTHER INSOMNIA: Primary | Chronic | ICD-10-CM

## 2020-04-21 DIAGNOSIS — F41.1 GAD (GENERALIZED ANXIETY DISORDER): Primary | Chronic | ICD-10-CM

## 2020-04-21 RX ORDER — LORAZEPAM 1 MG/1
1 TABLET ORAL 2 TIMES DAILY
Qty: 120 TABLET | Refills: 0 | Status: SHIPPED | OUTPATIENT
Start: 2020-04-21 | End: 2020-05-06 | Stop reason: SDUPTHER

## 2020-04-23 ENCOUNTER — TELEPHONE (OUTPATIENT)
Dept: PSYCHIATRY | Facility: CLINIC | Age: 70
End: 2020-04-23

## 2020-05-06 ENCOUNTER — TELEMEDICINE (OUTPATIENT)
Dept: PSYCHIATRY | Facility: CLINIC | Age: 70
End: 2020-05-06
Payer: COMMERCIAL

## 2020-05-06 VITALS — BODY MASS INDEX: 43.06 KG/M2 | WEIGHT: 234 LBS | HEIGHT: 62 IN

## 2020-05-06 DIAGNOSIS — G47.09 OTHER INSOMNIA: Chronic | ICD-10-CM

## 2020-05-06 DIAGNOSIS — F41.1 GAD (GENERALIZED ANXIETY DISORDER): Chronic | ICD-10-CM

## 2020-05-06 DIAGNOSIS — F31.76 BIPOLAR I DISORDER, MOST RECENT EPISODE DEPRESSED, IN FULL REMISSION (HCC): Primary | Chronic | ICD-10-CM

## 2020-05-06 DIAGNOSIS — F60.9 PERSONALITY DISORDER (HCC): Chronic | ICD-10-CM

## 2020-05-06 DIAGNOSIS — F43.12 POST-TRAUMATIC STRESS DISORDER, CHRONIC: Chronic | ICD-10-CM

## 2020-05-06 DIAGNOSIS — G25.9 EXTRAPYRAMIDAL REACTION: Chronic | ICD-10-CM

## 2020-05-06 PROBLEM — M25.562 LEFT KNEE PAIN: Status: ACTIVE | Noted: 2019-11-30

## 2020-05-06 PROCEDURE — G2012 BRIEF CHECK IN BY MD/QHP: HCPCS | Performed by: PSYCHIATRY & NEUROLOGY

## 2020-05-06 PROCEDURE — 90833 PSYTX W PT W E/M 30 MIN: CPT | Performed by: PSYCHIATRY & NEUROLOGY

## 2020-05-06 RX ORDER — LORAZEPAM 1 MG/1
1 TABLET ORAL 2 TIMES DAILY
Qty: 120 TABLET | Refills: 4 | Status: SHIPPED | OUTPATIENT
Start: 2020-05-21 | End: 2020-09-16 | Stop reason: SDUPTHER

## 2020-05-06 RX ORDER — ARIPIPRAZOLE 15 MG/1
15 TABLET ORAL
Qty: 30 TABLET | Refills: 4 | Status: SHIPPED | OUTPATIENT
Start: 2020-05-06 | End: 2020-09-16 | Stop reason: SDUPTHER

## 2020-05-06 RX ORDER — DULOXETIN HYDROCHLORIDE 60 MG/1
120 CAPSULE, DELAYED RELEASE ORAL DAILY
Qty: 60 CAPSULE | Refills: 4 | Status: SHIPPED | OUTPATIENT
Start: 2020-05-06 | End: 2020-09-16 | Stop reason: SDUPTHER

## 2020-05-06 RX ORDER — BENZTROPINE MESYLATE 0.5 MG/1
0.5 TABLET ORAL 2 TIMES DAILY PRN
Qty: 60 TABLET | Refills: 4 | Status: SHIPPED | OUTPATIENT
Start: 2020-05-06 | End: 2020-09-16 | Stop reason: ALTCHOICE

## 2020-09-06 DIAGNOSIS — F31.76 BIPOLAR I DISORDER, MOST RECENT EPISODE DEPRESSED, IN FULL REMISSION (HCC): Chronic | ICD-10-CM

## 2020-09-08 RX ORDER — ARIPIPRAZOLE 15 MG/1
TABLET ORAL
Qty: 90 TABLET | Refills: 1 | OUTPATIENT
Start: 2020-09-08
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
## 2020-09-16 ENCOUNTER — OFFICE VISIT (OUTPATIENT)
Dept: PSYCHIATRY | Facility: CLINIC | Age: 70
End: 2020-09-16
Payer: COMMERCIAL

## 2020-09-16 VITALS — WEIGHT: 237 LBS | HEIGHT: 63 IN | BODY MASS INDEX: 41.99 KG/M2

## 2020-09-16 DIAGNOSIS — F41.1 GAD (GENERALIZED ANXIETY DISORDER): Chronic | ICD-10-CM

## 2020-09-16 DIAGNOSIS — F60.9 PERSONALITY DISORDER (HCC): Chronic | ICD-10-CM

## 2020-09-16 DIAGNOSIS — G47.09 OTHER INSOMNIA: Chronic | ICD-10-CM

## 2020-09-16 DIAGNOSIS — F43.12 POST-TRAUMATIC STRESS DISORDER, CHRONIC: Chronic | ICD-10-CM

## 2020-09-16 DIAGNOSIS — F31.76 BIPOLAR I DISORDER, MOST RECENT EPISODE DEPRESSED, IN FULL REMISSION (HCC): Primary | Chronic | ICD-10-CM

## 2020-09-16 PROBLEM — G25.9 EXTRAPYRAMIDAL REACTION: Chronic | Status: RESOLVED | Noted: 2019-05-30 | Resolved: 2020-09-16

## 2020-09-16 PROCEDURE — 99213 OFFICE O/P EST LOW 20 MIN: CPT | Performed by: PSYCHIATRY & NEUROLOGY

## 2020-09-16 PROCEDURE — 90833 PSYTX W PT W E/M 30 MIN: CPT | Performed by: PSYCHIATRY & NEUROLOGY

## 2020-09-16 RX ORDER — SUVOREXANT 5 MG/1
5 TABLET, FILM COATED ORAL
Qty: 30 TABLET | Refills: 4 | Status: SHIPPED | OUTPATIENT
Start: 2020-09-16 | End: 2021-01-15 | Stop reason: SDUPTHER

## 2020-09-16 RX ORDER — IBUPROFEN 600 MG/1
600 TABLET ORAL EVERY 6 HOURS PRN
COMMUNITY
Start: 2020-07-24 | End: 2022-07-26

## 2020-09-16 RX ORDER — ARIPIPRAZOLE 15 MG/1
15 TABLET ORAL
Qty: 30 TABLET | Refills: 4 | Status: SHIPPED | OUTPATIENT
Start: 2020-09-16 | End: 2021-01-15 | Stop reason: SDUPTHER

## 2020-09-16 RX ORDER — LORAZEPAM 1 MG/1
TABLET ORAL
Qty: 120 TABLET | Refills: 3 | Status: SHIPPED | OUTPATIENT
Start: 2020-10-18 | End: 2021-01-15 | Stop reason: SDUPTHER

## 2020-09-16 RX ORDER — DULOXETIN HYDROCHLORIDE 60 MG/1
120 CAPSULE, DELAYED RELEASE ORAL DAILY
Qty: 60 CAPSULE | Refills: 4 | Status: SHIPPED | OUTPATIENT
Start: 2020-09-16 | End: 2021-01-15 | Stop reason: SDUPTHER

## 2020-09-16 RX ORDER — CEPHALEXIN 500 MG/1
CAPSULE ORAL
COMMUNITY
Start: 2020-08-25 | End: 2021-01-15 | Stop reason: ALTCHOICE

## 2020-09-16 NOTE — BH TREATMENT PLAN
TREATMENT PLAN (Medication Management Only)        [facility]    Name/Date of Birth/MRN:  John Cardozo 78 y o  1950 MRN: [id number]  Date of Treatment Plan: September 16, 2020  Diagnosis/Diagnoses:   1  Bipolar I disorder, most recent episode depressed, in full remission ([facility] )    2  TATIANA (generalized anxiety disorder)    3  Post-traumatic stress disorder, chronic    4  Personality disorder ([facility] )    5  Other insomnia      Strengths/Personal Resources for Self-Care: "I started exercising"  Area/Areas of need (in own words): "to get outside more"  1  Long Term Goal:   maintain improvement in anxiety, maintain mood stability  Target Date: 4 months - 1/16/2021  Person/Persons responsible for completion of goal: Cong Donato  2  Short Term Objective (s) - How will we reach this goal?:   A  Provider new recommended medication/dosage changes and/or continue medication(s): continue current medications as prescribed (Cymbalta, Abilify, Ativan and Belsomra)  B   N/A   C   N/A  Target Date: 4 months - 1/16/2021  Person/Persons Responsible for Completion of Goal: Cong Donato   Progress Towards Goals: stable  Treatment Modality: medication management every 4 months  Review due 180 days from date of this plan: 6 months - 3/16/2021  Expected length of service: maintenance unless revised  My Physician/PA/NP and I have developed this plan together and I agree to work on the goals and objectives  I understand the treatment goals that were developed for my treatment    Electronic Signatures: on file (unless signed below)    Drew Rivero MD 09/16/20

## 2020-11-23 DIAGNOSIS — F31.76 BIPOLAR I DISORDER, MOST RECENT EPISODE DEPRESSED, IN FULL REMISSION (HCC): Chronic | ICD-10-CM

## 2020-11-27 ENCOUNTER — TELEPHONE (OUTPATIENT)
Dept: PSYCHIATRY | Facility: CLINIC | Age: 70
End: 2020-11-27

## 2020-11-30 RX ORDER — ARIPIPRAZOLE 15 MG/1
TABLET ORAL
Qty: 90 TABLET | Refills: 1 | OUTPATIENT
Start: 2020-11-30
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
## 2021-01-15 ENCOUNTER — OFFICE VISIT (OUTPATIENT)
Dept: PSYCHIATRY | Facility: CLINIC | Age: 71
End: 2021-01-15
Payer: COMMERCIAL

## 2021-01-15 VITALS — WEIGHT: 237 LBS | BODY MASS INDEX: 41.99 KG/M2 | HEIGHT: 63 IN

## 2021-01-15 DIAGNOSIS — F41.1 GAD (GENERALIZED ANXIETY DISORDER): Chronic | ICD-10-CM

## 2021-01-15 DIAGNOSIS — G47.09 OTHER INSOMNIA: Chronic | ICD-10-CM

## 2021-01-15 DIAGNOSIS — F31.76 BIPOLAR I DISORDER, MOST RECENT EPISODE DEPRESSED, IN FULL REMISSION (HCC): Primary | Chronic | ICD-10-CM

## 2021-01-15 DIAGNOSIS — F43.12 POST-TRAUMATIC STRESS DISORDER, CHRONIC: Chronic | ICD-10-CM

## 2021-01-15 DIAGNOSIS — F60.9 PERSONALITY DISORDER (HCC): Chronic | ICD-10-CM

## 2021-01-15 PROCEDURE — 90833 PSYTX W PT W E/M 30 MIN: CPT | Performed by: PSYCHIATRY & NEUROLOGY

## 2021-01-15 PROCEDURE — 99214 OFFICE O/P EST MOD 30 MIN: CPT | Performed by: PSYCHIATRY & NEUROLOGY

## 2021-01-15 RX ORDER — DULOXETIN HYDROCHLORIDE 60 MG/1
120 CAPSULE, DELAYED RELEASE ORAL DAILY
Qty: 60 CAPSULE | Refills: 4 | Status: SHIPPED | OUTPATIENT
Start: 2021-01-15 | End: 2021-05-17 | Stop reason: SDUPTHER

## 2021-01-15 RX ORDER — LORAZEPAM 1 MG/1
TABLET ORAL
Qty: 120 TABLET | Refills: 3 | Status: SHIPPED | OUTPATIENT
Start: 2021-02-15 | End: 2021-05-17 | Stop reason: SDUPTHER

## 2021-01-15 RX ORDER — ARIPIPRAZOLE 15 MG/1
15 TABLET ORAL
Qty: 30 TABLET | Refills: 4 | Status: SHIPPED | OUTPATIENT
Start: 2021-01-15 | End: 2021-05-17 | Stop reason: SDUPTHER

## 2021-01-15 RX ORDER — SUVOREXANT 5 MG/1
5 TABLET, FILM COATED ORAL
Qty: 30 TABLET | Refills: 3 | Status: SHIPPED | OUTPATIENT
Start: 2021-02-13 | End: 2021-05-17 | Stop reason: SDUPTHER

## 2021-01-15 NOTE — BH TREATMENT PLAN
TREATMENT PLAN (Medication Management Only)        [facility]    Name/Date of Birth/MRN:  Kadeem Bautista 73 y o  1950 MRN: [id number]  Date of Treatment Plan: January 15, 2021  Diagnosis/Diagnoses:   1  Bipolar I disorder, most recent episode depressed, in full remission ([facility] )    2  TATIANA (generalized anxiety disorder)    3  Post-traumatic stress disorder, chronic    4  Personality disorder ([facility] )    5  Other insomnia      Strengths/Personal Resources for Self-Care: "I am a good listener"  Area/Areas of need (in own words): "my anxiety"  1  Long Term Goal:   maintain control of anxiety, maintain mood stability  Target Date: 4 months - 5/15/2021  Person/Persons responsible for completion of goal: Toño Eth  2  Short Term Objective (s) - How will we reach this goal?:   A  Provider new recommended medication/dosage changes and/or continue medication(s): continue current medications as prescribed (Cymbalta, Abilify, Ativan and Belsomra)  B   N/A   C   N/A  Target Date: 4 months - 5/15/2021  Person/Persons Responsible for Completion of Goal: Toño Eth   Progress Towards Goals: stable  Treatment Modality: medication management every 4 months  Review due 180 days from date of this plan: 6 months - 7/15/2021  Expected length of service: maintenance unless revised  My Physician/PA/NP and I have developed this plan together and I agree to work on the goals and objectives  I understand the treatment goals that were developed for my treatment    Electronic Signatures: on file (unless signed below)    Deepthi Coker MD 01/15/21
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
## 2021-05-17 ENCOUNTER — OFFICE VISIT (OUTPATIENT)
Dept: PSYCHIATRY | Facility: CLINIC | Age: 71
End: 2021-05-17
Payer: COMMERCIAL

## 2021-05-17 VITALS — WEIGHT: 240 LBS | HEIGHT: 63 IN | BODY MASS INDEX: 42.52 KG/M2

## 2021-05-17 DIAGNOSIS — F43.12 POST-TRAUMATIC STRESS DISORDER, CHRONIC: Chronic | ICD-10-CM

## 2021-05-17 DIAGNOSIS — F31.76 BIPOLAR I DISORDER, MOST RECENT EPISODE DEPRESSED, IN FULL REMISSION (HCC): Primary | Chronic | ICD-10-CM

## 2021-05-17 DIAGNOSIS — F41.1 GAD (GENERALIZED ANXIETY DISORDER): Chronic | ICD-10-CM

## 2021-05-17 DIAGNOSIS — G47.09 OTHER INSOMNIA: Chronic | ICD-10-CM

## 2021-05-17 DIAGNOSIS — F60.9 PERSONALITY DISORDER (HCC): Chronic | ICD-10-CM

## 2021-05-17 PROBLEM — S42.401K: Status: ACTIVE | Noted: 2017-05-18

## 2021-05-17 PROBLEM — M17.12 PRIMARY OSTEOARTHRITIS OF LEFT KNEE: Status: ACTIVE | Noted: 2021-04-06

## 2021-05-17 PROCEDURE — 90833 PSYTX W PT W E/M 30 MIN: CPT | Performed by: PSYCHIATRY & NEUROLOGY

## 2021-05-17 PROCEDURE — 99214 OFFICE O/P EST MOD 30 MIN: CPT | Performed by: PSYCHIATRY & NEUROLOGY

## 2021-05-17 RX ORDER — ARIPIPRAZOLE 15 MG/1
15 TABLET ORAL
Qty: 30 TABLET | Refills: 4 | Status: SHIPPED | OUTPATIENT
Start: 2021-05-17 | End: 2021-09-29 | Stop reason: SDUPTHER

## 2021-05-17 RX ORDER — SUVOREXANT 5 MG/1
5 TABLET, FILM COATED ORAL
Qty: 30 TABLET | Refills: 3 | Status: SHIPPED | OUTPATIENT
Start: 2021-06-13 | End: 2021-09-29 | Stop reason: ALTCHOICE

## 2021-05-17 RX ORDER — DULOXETIN HYDROCHLORIDE 60 MG/1
60 CAPSULE, DELAYED RELEASE ORAL DAILY
Qty: 30 CAPSULE | Refills: 4 | Status: SHIPPED | OUTPATIENT
Start: 2021-05-17 | End: 2021-09-29 | Stop reason: SDUPTHER

## 2021-05-17 RX ORDER — LORAZEPAM 1 MG/1
TABLET ORAL
Qty: 120 TABLET | Refills: 3 | Status: SHIPPED | OUTPATIENT
Start: 2021-06-15 | End: 2021-09-29 | Stop reason: SDUPTHER

## 2021-05-17 RX ORDER — NEOMYCIN SULFATE, POLYMYXIN B SULFATE AND DEXAMETHASONE 3.5; 10000; 1 MG/ML; [USP'U]/ML; MG/ML
SUSPENSION/ DROPS OPHTHALMIC
COMMUNITY
Start: 2021-04-29 | End: 2021-09-29 | Stop reason: ALTCHOICE

## 2021-05-17 NOTE — BH TREATMENT PLAN
TREATMENT PLAN (Medication Management Only)        [facility]    Name/Date of Birth/MRN:  Didi Diaz 73 y o  1950 MRN: [id number]  Date of Treatment Plan: May 17, 2021  Diagnosis/Diagnoses:   1  Bipolar I disorder, most recent episode depressed, in full remission ([facility] )    2  TATIANA (generalized anxiety disorder)    3  Post-traumatic stress disorder, chronic    4  Personality disorder ([facility] )    5  Other insomnia      Strengths/Personal Resources for Self-Care: "I try to think things through"  Area/Areas of need (in own words): "I am going to work on my weight"  1  Long Term Goal:   improve control of anxiety, maintain mood stability  Target Date: 4 months - 9/17/2021  Person/Persons responsible for completion of goal: Izora Loop  2  Short Term Objective (s) - How will we reach this goal?:   A  Provider new recommended medication/dosage changes and/or continue medication(s): continue current medications as prescribed (Cymbalta, Abilify, Ativan and Belsomra)  B   N/A   C   N/A  Target Date: 4 months - 9/17/2021  Person/Persons Responsible for Completion of Goal: Izora Loop   Progress Towards Goals: stable  Treatment Modality: medication management every 4 months  Review due 180 days from date of this plan: 6 months - 11/17/2021  Expected length of service: maintenance unless revised  My Physician/PA/NP and I have developed this plan together and I agree to work on the goals and objectives  I understand the treatment goals that were developed for my treatment    Electronic Signatures: on file (unless signed below)    Tulio Soliman MD 05/17/21
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
## 2021-09-29 ENCOUNTER — OFFICE VISIT (OUTPATIENT)
Dept: PSYCHIATRY | Facility: CLINIC | Age: 71
End: 2021-09-29
Payer: COMMERCIAL

## 2021-09-29 VITALS
WEIGHT: 238 LBS | BODY MASS INDEX: 42.17 KG/M2 | DIASTOLIC BLOOD PRESSURE: 79 MMHG | SYSTOLIC BLOOD PRESSURE: 144 MMHG | HEIGHT: 63 IN | HEART RATE: 84 BPM

## 2021-09-29 DIAGNOSIS — F43.12 POST-TRAUMATIC STRESS DISORDER, CHRONIC: Chronic | ICD-10-CM

## 2021-09-29 DIAGNOSIS — F60.9 PERSONALITY DISORDER (HCC): Chronic | ICD-10-CM

## 2021-09-29 DIAGNOSIS — F31.76 BIPOLAR I DISORDER, MOST RECENT EPISODE DEPRESSED, IN FULL REMISSION (HCC): Primary | Chronic | ICD-10-CM

## 2021-09-29 DIAGNOSIS — G47.09 OTHER INSOMNIA: Chronic | ICD-10-CM

## 2021-09-29 DIAGNOSIS — F41.1 GAD (GENERALIZED ANXIETY DISORDER): Chronic | ICD-10-CM

## 2021-09-29 DIAGNOSIS — Z79.899 LONG-TERM USE OF HIGH-RISK MEDICATION: Chronic | ICD-10-CM

## 2021-09-29 PROCEDURE — 99214 OFFICE O/P EST MOD 30 MIN: CPT | Performed by: PSYCHIATRY & NEUROLOGY

## 2021-09-29 PROCEDURE — 90833 PSYTX W PT W E/M 30 MIN: CPT | Performed by: PSYCHIATRY & NEUROLOGY

## 2021-09-29 RX ORDER — DULOXETIN HYDROCHLORIDE 60 MG/1
60 CAPSULE, DELAYED RELEASE ORAL DAILY
Qty: 30 CAPSULE | Refills: 4 | Status: SHIPPED | OUTPATIENT
Start: 2021-09-29 | End: 2022-05-24

## 2021-09-29 RX ORDER — ARIPIPRAZOLE 15 MG/1
15 TABLET ORAL
Qty: 30 TABLET | Refills: 4 | Status: SHIPPED | OUTPATIENT
Start: 2021-09-29 | End: 2022-04-25 | Stop reason: SDUPTHER

## 2021-09-29 RX ORDER — LORAZEPAM 1 MG/1
TABLET ORAL
Qty: 120 TABLET | Refills: 3 | Status: SHIPPED | OUTPATIENT
Start: 2021-10-13 | End: 2022-04-25 | Stop reason: SDUPTHER

## 2021-09-29 NOTE — BH TREATMENT PLAN
TREATMENT PLAN (Medication Management Only)        [facility]    Name/Date of Birth/MRN:  Lucy Muse 73 y o  1950 MRN: [id number]  Date of Treatment Plan: September 29, 2021  Diagnosis/Diagnoses:   1  Bipolar I disorder, most recent episode depressed, in full remission ([facility] )    2  TATIANA (generalized anxiety disorder)    3  Post-traumatic stress disorder, chronic    4  Personality disorder ([facility] )    5  Other insomnia    6  Long-term use of high-risk medication      Strengths/Personal Resources for Self-Care: "I try to think things through, I try to analyze it more"  Area/Areas of need (in own words): "I would like to maintain what I have"  1  Long Term Goal:   maintain control of anxiety, maintain mood stability, help with sleep  Target Date: 4 months - 1/29/2022  Person/Persons responsible for completion of goal: Guzman Hallman  2  Short Term Objective (s) - How will we reach this goal?:   A  Provider new recommended medication/dosage changes and/or continue medication(s): discontinue Belsomra, continue all other medications (Cymbalta, Abilify and Ativan)  B   N/A   C   N/A  Target Date: 4 months - 1/29/2022  Person/Persons Responsible for Completion of Goal: Guzman Hallman   Progress Towards Goals: stable  Treatment Modality: medication management every 4 months  Review due 180 days from date of this plan: 6 months - 3/29/2022  Expected length of service: maintenance unless revised  My Physician/PA/NP and I have developed this plan together and I agree to work on the goals and objectives  I understand the treatment goals that were developed for my treatment    Electronic Signatures: on file (unless signed below)    Ray Siegel MD 09/29/21

## 2022-04-25 ENCOUNTER — TELEPHONE (OUTPATIENT)
Dept: PSYCHIATRY | Facility: CLINIC | Age: 72
End: 2022-04-25

## 2022-04-25 DIAGNOSIS — F41.1 GAD (GENERALIZED ANXIETY DISORDER): Chronic | ICD-10-CM

## 2022-04-25 DIAGNOSIS — F31.76 BIPOLAR I DISORDER, MOST RECENT EPISODE DEPRESSED, IN FULL REMISSION (HCC): Primary | Chronic | ICD-10-CM

## 2022-04-25 DIAGNOSIS — G47.09 OTHER INSOMNIA: Chronic | ICD-10-CM

## 2022-04-25 RX ORDER — ARIPIPRAZOLE 15 MG/1
15 TABLET ORAL
Qty: 30 TABLET | Refills: 3 | Status: SHIPPED | OUTPATIENT
Start: 2022-04-25 | End: 2022-07-26 | Stop reason: SDUPTHER

## 2022-04-25 RX ORDER — LORAZEPAM 1 MG/1
TABLET ORAL
Qty: 120 TABLET | Refills: 3 | Status: SHIPPED | OUTPATIENT
Start: 2022-04-25 | End: 2022-07-26 | Stop reason: ALTCHOICE

## 2022-04-25 NOTE — TELEPHONE ENCOUNTER
Received a fax from [facility] for a PA request for the Lorazepam 1 mg tab (PRN)  Initiated and completed the PA request via [employer] for [employer]  Spoke to May Jean Baptiste to notify her of the Lorazepam 1 mg tab PA request and May Jean Baptiste is familiar with the PA process  Will call her back when a decision is reached  For your review

## 2022-04-26 NOTE — TELEPHONE ENCOUNTER
Received a fax from [facility] approving the Lorazepam 1 mg tab (PRN) from 4/26/22-12/31/22  Spoke to the pharmacist at [facility] to review the PA approval and the pharmacist received a paid claim  Spoke to Nessa Marshall to review the same  For your review  Will scan to Media

## 2022-05-24 DIAGNOSIS — F41.1 GAD (GENERALIZED ANXIETY DISORDER): Chronic | ICD-10-CM

## 2022-05-24 DIAGNOSIS — F31.76 BIPOLAR I DISORDER, MOST RECENT EPISODE DEPRESSED, IN FULL REMISSION (HCC): Primary | Chronic | ICD-10-CM

## 2022-05-24 RX ORDER — DULOXETIN HYDROCHLORIDE 60 MG/1
60 CAPSULE, DELAYED RELEASE ORAL DAILY
Qty: 30 CAPSULE | Refills: 2 | Status: SHIPPED | OUTPATIENT
Start: 2022-05-24 | End: 2022-07-26 | Stop reason: SDUPTHER
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
## 2022-07-26 ENCOUNTER — OFFICE VISIT (OUTPATIENT)
Dept: PSYCHIATRY | Facility: CLINIC | Age: 72
End: 2022-07-26
Payer: COMMERCIAL

## 2022-07-26 VITALS
BODY MASS INDEX: 42.17 KG/M2 | SYSTOLIC BLOOD PRESSURE: 122 MMHG | HEART RATE: 92 BPM | HEIGHT: 63 IN | WEIGHT: 238 LBS | DIASTOLIC BLOOD PRESSURE: 78 MMHG

## 2022-07-26 DIAGNOSIS — F41.1 GAD (GENERALIZED ANXIETY DISORDER): Chronic | ICD-10-CM

## 2022-07-26 DIAGNOSIS — G47.09 OTHER INSOMNIA: Chronic | ICD-10-CM

## 2022-07-26 DIAGNOSIS — Z79.899 MEDICAL MARIJUANA USE: Chronic | ICD-10-CM

## 2022-07-26 DIAGNOSIS — F43.12 POST-TRAUMATIC STRESS DISORDER, CHRONIC: Chronic | ICD-10-CM

## 2022-07-26 DIAGNOSIS — F60.9 PERSONALITY DISORDER (HCC): Chronic | ICD-10-CM

## 2022-07-26 DIAGNOSIS — F31.76 BIPOLAR I DISORDER, MOST RECENT EPISODE DEPRESSED, IN FULL REMISSION (HCC): Primary | Chronic | ICD-10-CM

## 2022-07-26 DIAGNOSIS — Z79.899 LONG-TERM USE OF HIGH-RISK MEDICATION: Chronic | ICD-10-CM

## 2022-07-26 PROCEDURE — 90833 PSYTX W PT W E/M 30 MIN: CPT | Performed by: PSYCHIATRY & NEUROLOGY

## 2022-07-26 PROCEDURE — 99214 OFFICE O/P EST MOD 30 MIN: CPT | Performed by: PSYCHIATRY & NEUROLOGY

## 2022-07-26 RX ORDER — DULOXETIN HYDROCHLORIDE 60 MG/1
60 CAPSULE, DELAYED RELEASE ORAL DAILY
Qty: 30 CAPSULE | Refills: 4 | Status: SHIPPED | OUTPATIENT
Start: 2022-07-26 | End: 2022-12-23

## 2022-07-26 RX ORDER — FLUTICASONE PROPIONATE 50 MCG
2 SPRAY, SUSPENSION (ML) NASAL DAILY
COMMUNITY
Start: 2022-05-19

## 2022-07-26 RX ORDER — HYDROXYZINE HYDROCHLORIDE 25 MG/1
TABLET, FILM COATED ORAL
Qty: 90 TABLET | Refills: 4 | Status: SHIPPED | OUTPATIENT
Start: 2022-07-26 | End: 2022-12-23

## 2022-07-26 RX ORDER — ARIPIPRAZOLE 15 MG/1
15 TABLET ORAL
Qty: 30 TABLET | Refills: 4 | Status: SHIPPED | OUTPATIENT
Start: 2022-07-26 | End: 2022-12-23

## 2022-07-26 NOTE — BH TREATMENT PLAN
TREATMENT PLAN (Medication Management Only)        [facility]    Name/Date of Birth/MRN:  Mindi Merritt 81 y o  1950 MRN: [id number]  Date of Treatment Plan: July 26, 2022  Diagnosis/Diagnoses:   1  Bipolar I disorder, most recent episode depressed, in full remission ([facility] )    2  TATIANA (generalized anxiety disorder)    3  Post-traumatic stress disorder, chronic    4  Personality disorder ([facility] )    5  Other insomnia    6  Long-term use of high-risk medication    7  Medical marijuana use      Strengths/Personal Resources for Self-Care: "I have not had any episodes, I enjoy life"  Area/Areas of need (in own words): "keeping up a good profile"  1  Long Term Goal:   maintain control of anxiety, maintain mood stability  Target Date: 4 months - 11/26/2022  Person/Persons responsible for completion of goal: John Hoffman  2  Short Term Objective (s) - How will we reach this goal?:   A  Provider new recommended medication/dosage changes and/or continue medication(s): discontinue Ativan, start Atarax, continue all other medications (Cymbalta and Abilify)  B   N/A   C   N/A  Target Date: 4 months - 11/26/2022  Person/Persons Responsible for Completion of Goal: John Hoffman   Progress Towards Goals: stable  Treatment Modality: medication management every 4 months  Review due 180 days from date of this plan: 6 months - 1/26/2023  Expected length of service: maintenance unless revised  My Physician/PA/NP and I have developed this plan together and I agree to work on the goals and objectives  I understand the treatment goals that were developed for my treatment    Electronic Signatures: on file (unless signed below)    Maddie Penny MD 07/26/22

## 2022-12-20 ENCOUNTER — OFFICE VISIT (OUTPATIENT)
Dept: PSYCHIATRY | Facility: CLINIC | Age: 72
End: 2022-12-20

## 2022-12-20 VITALS
BODY MASS INDEX: 41.29 KG/M2 | WEIGHT: 233 LBS | DIASTOLIC BLOOD PRESSURE: 79 MMHG | HEIGHT: 63 IN | SYSTOLIC BLOOD PRESSURE: 138 MMHG | HEART RATE: 87 BPM

## 2022-12-20 DIAGNOSIS — F31.76 BIPOLAR I DISORDER, MOST RECENT EPISODE DEPRESSED, IN FULL REMISSION (HCC): Primary | Chronic | ICD-10-CM

## 2022-12-20 DIAGNOSIS — F43.12 POST-TRAUMATIC STRESS DISORDER, CHRONIC: Chronic | ICD-10-CM

## 2022-12-20 DIAGNOSIS — Z79.899 LONG-TERM USE OF HIGH-RISK MEDICATION: Chronic | ICD-10-CM

## 2022-12-20 DIAGNOSIS — F41.1 GAD (GENERALIZED ANXIETY DISORDER): Chronic | ICD-10-CM

## 2022-12-20 DIAGNOSIS — G47.09 OTHER INSOMNIA: Chronic | ICD-10-CM

## 2022-12-20 DIAGNOSIS — F60.9 PERSONALITY DISORDER (HCC): Chronic | ICD-10-CM

## 2022-12-20 PROBLEM — M47.816 LUMBAR SPONDYLOSIS: Status: ACTIVE | Noted: 2022-09-02

## 2022-12-20 PROBLEM — K29.00 ACUTE SUPERFICIAL GASTRITIS WITHOUT HEMORRHAGE: Status: ACTIVE | Noted: 2022-08-25

## 2022-12-20 RX ORDER — CLINDAMYCIN HYDROCHLORIDE 300 MG/1
CAPSULE ORAL
COMMUNITY
Start: 2022-10-10

## 2022-12-20 RX ORDER — ARIPIPRAZOLE 15 MG/1
15 TABLET ORAL
Qty: 30 TABLET | Refills: 4 | Status: SHIPPED | OUTPATIENT
Start: 2022-12-20 | End: 2023-05-19

## 2022-12-20 RX ORDER — PANTOPRAZOLE SODIUM 40 MG/1
40 TABLET, DELAYED RELEASE ORAL 2 TIMES DAILY
COMMUNITY
Start: 2022-11-22

## 2022-12-20 RX ORDER — DULOXETIN HYDROCHLORIDE 60 MG/1
60 CAPSULE, DELAYED RELEASE ORAL DAILY
Qty: 30 CAPSULE | Refills: 4 | Status: SHIPPED | OUTPATIENT
Start: 2022-12-20 | End: 2023-05-19

## 2022-12-20 RX ORDER — FAMOTIDINE 40 MG/1
40 TABLET, FILM COATED ORAL
COMMUNITY
Start: 2022-11-21

## 2022-12-20 RX ORDER — HYDROXYZINE HYDROCHLORIDE 25 MG/1
TABLET, FILM COATED ORAL
Qty: 90 TABLET | Refills: 4 | Status: SHIPPED | OUTPATIENT
Start: 2022-12-20 | End: 2023-05-19

## 2022-12-20 NOTE — BH TREATMENT PLAN
TREATMENT PLAN (Medication Management Only)        [facility]    Name/Date of Birth/MRN:  Qian Briones 81 y o  1950 MRN: [id number]  Date of Treatment Plan: December 20, 2022  Diagnosis/Diagnoses:   1  Bipolar I disorder, most recent episode depressed, in full remission ([facility] )    2  TATIANA (generalized anxiety disorder)    3  Post-traumatic stress disorder, chronic    4  Personality disorder ([facility] )    5  Other insomnia    6  Long-term use of high-risk medication      Strengths/Personal Resources for Self-Care: "I try to reason things out"  Area/Areas of need (in own words): "sleeping all night"  1  Long Term Goal:   maintain control of anxiety, maintain mood stability  Target Date: 4 months - 4/20/2023  Person/Persons responsible for completion of goal: Ferdinand Flores  2  Short Term Objective (s) - How will we reach this goal?:   A  Provider new recommended medication/dosage changes and/or continue medication(s): continue current medications as prescribed (Cymbalta, Abilify and Atarax)  B   N/A   C   N/A  Target Date: 4 months - 4/20/2023  Person/Persons Responsible for Completion of Goal: Ferdinand Flores   Progress Towards Goals: stable  Treatment Modality: medication management every 4 months  Review due 180 days from date of this plan: 6 months - 6/20/2023  Expected length of service: ongoing treatment unless revised  My Physician/PA/NP and I have developed this plan together and I agree to work on the goals and objectives  I understand the treatment goals that were developed for my treatment    Electronic Signatures: on file (unless signed below)    Jean Paul Vázquez MD 12/20/22
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
## 2023-04-24 ENCOUNTER — OFFICE VISIT (OUTPATIENT)
Dept: PSYCHIATRY | Facility: CLINIC | Age: 73
End: 2023-04-24

## 2023-04-24 VITALS
HEIGHT: 63 IN | BODY MASS INDEX: 42.35 KG/M2 | SYSTOLIC BLOOD PRESSURE: 140 MMHG | HEART RATE: 91 BPM | DIASTOLIC BLOOD PRESSURE: 64 MMHG | WEIGHT: 239 LBS

## 2023-04-24 DIAGNOSIS — F60.9 PERSONALITY DISORDER (HCC): Chronic | ICD-10-CM

## 2023-04-24 DIAGNOSIS — G47.09 OTHER INSOMNIA: Chronic | ICD-10-CM

## 2023-04-24 DIAGNOSIS — Z79.899 LONG-TERM USE OF HIGH-RISK MEDICATION: Chronic | ICD-10-CM

## 2023-04-24 DIAGNOSIS — Z79.899 MEDICAL MARIJUANA USE: Chronic | ICD-10-CM

## 2023-04-24 DIAGNOSIS — F31.76 BIPOLAR I DISORDER, MOST RECENT EPISODE DEPRESSED, IN FULL REMISSION (HCC): Primary | Chronic | ICD-10-CM

## 2023-04-24 DIAGNOSIS — F41.1 GAD (GENERALIZED ANXIETY DISORDER): Chronic | ICD-10-CM

## 2023-04-24 DIAGNOSIS — F43.12 POST-TRAUMATIC STRESS DISORDER, CHRONIC: Chronic | ICD-10-CM

## 2023-04-24 RX ORDER — DULOXETIN HYDROCHLORIDE 60 MG/1
60 CAPSULE, DELAYED RELEASE ORAL DAILY
Qty: 30 CAPSULE | Refills: 4 | Status: SHIPPED | OUTPATIENT
Start: 2023-04-24 | End: 2023-09-21

## 2023-04-24 RX ORDER — HYDROXYZINE HYDROCHLORIDE 25 MG/1
TABLET, FILM COATED ORAL
Qty: 90 TABLET | Refills: 4 | Status: SHIPPED | OUTPATIENT
Start: 2023-04-24 | End: 2023-09-21

## 2023-04-24 RX ORDER — ARIPIPRAZOLE 15 MG/1
15 TABLET ORAL
Qty: 30 TABLET | Refills: 4 | Status: SHIPPED | OUTPATIENT
Start: 2023-04-24 | End: 2023-09-21

## 2023-04-24 NOTE — BH TREATMENT PLAN
"TREATMENT PLAN (Medication Management Only)        [facility]    Name/Date of Birth/MRN:  Yanira Vargas 72 y o  1950 MRN: [id number]  Date of Treatment Plan: April 24, 2023  Diagnosis/Diagnoses:   1  Bipolar I disorder, most recent episode depressed, in full remission ([facility] )    2  TATIANA (generalized anxiety disorder)    3  Post-traumatic stress disorder, chronic    4  Personality disorder ([facility] )    5  Long-term use of high-risk medication    6  Medical marijuana use    7  Other insomnia      Strengths/Personal Resources for Self-Care: \"I think I am a strong person when I am healthy\"  Area/Areas of need (in own words): \"exercising more\"  1  Long Term Goal:   maintain control of anxiety, maintain control of depression, maintain mood stability  Target Date: 4 months - 8/24/2023  Person/Persons responsible for completion of goal: Alexsandra Weaver  2  Short Term Objective (s) - How will we reach this goal?:   A  Provider new recommended medication/dosage changes and/or continue medication(s): continue current medications as prescribed (Cymbalta, Abilify and Atarax)  B   N/A   C   N/A  Target Date: 4 months - 8/24/2023  Person/Persons Responsible for Completion of Goal: Alexsandra Weaver   Progress Towards Goals: stable  Treatment Modality: medication management every 4 months  Review due 180 days from date of this plan: 6 months - 10/24/2023  Expected length of service: maintenance unless revised  My Physician/PA/NP and I have developed this plan together and I agree to work on the goals and objectives  I understand the treatment goals that were developed for my treatment    Electronic Signatures: on file (unless signed below)    Harrison Marie MD 04/24/23  "

## 2023-08-04 DIAGNOSIS — F31.76 BIPOLAR I DISORDER, MOST RECENT EPISODE DEPRESSED, IN FULL REMISSION (HCC): Primary | Chronic | ICD-10-CM

## 2023-08-04 DIAGNOSIS — F41.1 GAD (GENERALIZED ANXIETY DISORDER): Chronic | ICD-10-CM

## 2023-08-04 RX ORDER — DULOXETIN HYDROCHLORIDE 60 MG/1
60 CAPSULE, DELAYED RELEASE ORAL DAILY
Qty: 30 CAPSULE | Refills: 1 | Status: SHIPPED | OUTPATIENT
Start: 2023-08-04 | End: 2023-10-03

## 2023-08-27 DIAGNOSIS — F41.1 GAD (GENERALIZED ANXIETY DISORDER): Chronic | ICD-10-CM

## 2023-08-27 DIAGNOSIS — F31.76 BIPOLAR I DISORDER, MOST RECENT EPISODE DEPRESSED, IN FULL REMISSION (HCC): Chronic | ICD-10-CM

## 2023-08-28 RX ORDER — DULOXETIN HYDROCHLORIDE 60 MG/1
60 CAPSULE, DELAYED RELEASE ORAL DAILY
Qty: 90 CAPSULE | Refills: 1 | OUTPATIENT
Start: 2023-08-28

## 2023-09-19 DIAGNOSIS — F31.76 BIPOLAR I DISORDER, MOST RECENT EPISODE DEPRESSED, IN FULL REMISSION (HCC): Primary | Chronic | ICD-10-CM

## 2023-09-19 RX ORDER — ARIPIPRAZOLE 15 MG/1
15 TABLET ORAL
Qty: 30 TABLET | Refills: 0 | Status: SHIPPED | OUTPATIENT
Start: 2023-09-19 | End: 2023-10-19

## 2023-09-28 DIAGNOSIS — F41.1 GAD (GENERALIZED ANXIETY DISORDER): Chronic | ICD-10-CM

## 2023-09-28 DIAGNOSIS — F31.76 BIPOLAR I DISORDER, MOST RECENT EPISODE DEPRESSED, IN FULL REMISSION (HCC): Primary | Chronic | ICD-10-CM

## 2023-09-28 RX ORDER — DULOXETIN HYDROCHLORIDE 60 MG/1
60 CAPSULE, DELAYED RELEASE ORAL DAILY
Qty: 30 CAPSULE | Refills: 0 | Status: SHIPPED | OUTPATIENT
Start: 2023-09-28 | End: 2023-10-28
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
## 2023-10-11 ENCOUNTER — TELEPHONE (OUTPATIENT)
Dept: PSYCHIATRY | Facility: CLINIC | Age: 73
End: 2023-10-11

## 2023-10-13 ENCOUNTER — OFFICE VISIT (OUTPATIENT)
Dept: PSYCHIATRY | Facility: CLINIC | Age: 73
End: 2023-10-13
Payer: COMMERCIAL

## 2023-10-13 VITALS
BODY MASS INDEX: 44.93 KG/M2 | HEIGHT: 61 IN | DIASTOLIC BLOOD PRESSURE: 83 MMHG | SYSTOLIC BLOOD PRESSURE: 139 MMHG | WEIGHT: 238 LBS | HEART RATE: 103 BPM

## 2023-10-13 DIAGNOSIS — F43.12 POST-TRAUMATIC STRESS DISORDER, CHRONIC: Chronic | ICD-10-CM

## 2023-10-13 DIAGNOSIS — Z79.899 MEDICAL MARIJUANA USE: Chronic | ICD-10-CM

## 2023-10-13 DIAGNOSIS — F31.76 BIPOLAR I DISORDER, MOST RECENT EPISODE DEPRESSED, IN FULL REMISSION (HCC): Primary | Chronic | ICD-10-CM

## 2023-10-13 DIAGNOSIS — F60.9 PERSONALITY DISORDER (HCC): Chronic | ICD-10-CM

## 2023-10-13 DIAGNOSIS — F41.1 GAD (GENERALIZED ANXIETY DISORDER): Chronic | ICD-10-CM

## 2023-10-13 DIAGNOSIS — G47.09 OTHER INSOMNIA: Chronic | ICD-10-CM

## 2023-10-13 DIAGNOSIS — Z79.899 LONG-TERM USE OF HIGH-RISK MEDICATION: Chronic | ICD-10-CM

## 2023-10-13 PROCEDURE — 99214 OFFICE O/P EST MOD 30 MIN: CPT | Performed by: PSYCHIATRY & NEUROLOGY

## 2023-10-13 PROCEDURE — 90833 PSYTX W PT W E/M 30 MIN: CPT | Performed by: PSYCHIATRY & NEUROLOGY

## 2023-10-13 RX ORDER — COLESEVELAM 180 1/1
1250 TABLET ORAL 2 TIMES DAILY WITH MEALS
COMMUNITY
Start: 2023-08-14 | End: 2024-08-13

## 2023-10-13 RX ORDER — HYDROXYZINE HYDROCHLORIDE 25 MG/1
TABLET, FILM COATED ORAL
Qty: 90 TABLET | Refills: 4 | Status: SHIPPED | OUTPATIENT
Start: 2023-10-13 | End: 2024-03-11

## 2023-10-13 RX ORDER — DULOXETIN HYDROCHLORIDE 60 MG/1
60 CAPSULE, DELAYED RELEASE ORAL DAILY
Qty: 30 CAPSULE | Refills: 4 | Status: SHIPPED | OUTPATIENT
Start: 2023-10-13 | End: 2024-03-11

## 2023-10-13 RX ORDER — ARIPIPRAZOLE 15 MG/1
15 TABLET ORAL
Qty: 30 TABLET | Refills: 4 | Status: SHIPPED | OUTPATIENT
Start: 2023-10-13 | End: 2024-03-11

## 2023-10-13 NOTE — BH TREATMENT PLAN
TREATMENT PLAN (Medication Management Only)        [address] [facility]    Name/Date of Birth/MRN:  Armstead Duverney 72 y.o. 1950 MRN: [id number]  Date of Treatment Plan: October 13, 2023  Diagnosis/Diagnoses:   1. Bipolar I disorder, most recent episode depressed, in full remission ([facility])    2. TATIANA (generalized anxiety disorder)    3. Post-traumatic stress disorder, chronic    4. Personality disorder ([facility])    5. Long-term use of high-risk medication    6. Medical marijuana use    7. Other insomnia      Strengths/Personal Resources for Self-Care: "I am pleasant, I am friendly, active". Area/Areas of need (in own words): "I would like to do exercises". 1. Long Term Goal:   maintain control of anxiety, maintain mood stability  Target Date: 4 months - 2/13/2024  Person/Persons responsible for completion of goal: Catrina Turner  2. Short Term Objective (s) - How will we reach this goal?:   A. Provider new recommended medication/dosage changes and/or continue medication(s): continue current medications as prescribed (Cymbalta, Abilify, and Atarax). B.  N/A.  C.  N/A. Target Date: 4 months - 2/13/2024  Person/Persons Responsible for Completion of Goal: Catrina Turner   Progress Towards Goals: stable  Treatment Modality: medication management every 4 months  Review due 180 days from date of this plan: 6 months - 4/13/2024  Expected length of service: maintenance unless revised  My Physician/PA/NP and I have developed this plan together and I agree to work on the goals and objectives. I understand the treatment goals that were developed for my treatment.   Electronic Signatures: on file (unless signed below)    Erin Hoang MD 10/13/23
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
## 2024-02-21 ENCOUNTER — OFFICE VISIT (OUTPATIENT)
Dept: PSYCHIATRY | Facility: CLINIC | Age: 74
End: 2024-02-21
Payer: COMMERCIAL

## 2024-02-21 VITALS
HEART RATE: 93 BPM | BODY MASS INDEX: 42.7 KG/M2 | WEIGHT: 241 LBS | HEIGHT: 63 IN | DIASTOLIC BLOOD PRESSURE: 79 MMHG | SYSTOLIC BLOOD PRESSURE: 136 MMHG

## 2024-02-21 DIAGNOSIS — F41.1 GAD (GENERALIZED ANXIETY DISORDER): Chronic | ICD-10-CM

## 2024-02-21 DIAGNOSIS — F43.12 POST-TRAUMATIC STRESS DISORDER, CHRONIC: Chronic | ICD-10-CM

## 2024-02-21 DIAGNOSIS — F60.9 PERSONALITY DISORDER (HCC): Chronic | ICD-10-CM

## 2024-02-21 DIAGNOSIS — Z79.899 LONG-TERM USE OF HIGH-RISK MEDICATION: Chronic | ICD-10-CM

## 2024-02-21 DIAGNOSIS — F31.76 BIPOLAR I DISORDER, MOST RECENT EPISODE DEPRESSED, IN FULL REMISSION (HCC): Primary | Chronic | ICD-10-CM

## 2024-02-21 DIAGNOSIS — G47.09 OTHER INSOMNIA: Chronic | ICD-10-CM

## 2024-02-21 PROCEDURE — 90833 PSYTX W PT W E/M 30 MIN: CPT | Performed by: PSYCHIATRY & NEUROLOGY

## 2024-02-21 PROCEDURE — 99214 OFFICE O/P EST MOD 30 MIN: CPT | Performed by: PSYCHIATRY & NEUROLOGY

## 2024-02-21 PROCEDURE — G2211 COMPLEX E/M VISIT ADD ON: HCPCS | Performed by: PSYCHIATRY & NEUROLOGY

## 2024-02-21 RX ORDER — ARIPIPRAZOLE 15 MG/1
15 TABLET ORAL
Qty: 30 TABLET | Refills: 4 | Status: SHIPPED | OUTPATIENT
Start: 2024-02-21 | End: 2024-07-20

## 2024-02-21 RX ORDER — HYDROXYZINE HYDROCHLORIDE 25 MG/1
TABLET, FILM COATED ORAL
Qty: 90 TABLET | Refills: 4 | Status: SHIPPED | OUTPATIENT
Start: 2024-02-21 | End: 2024-07-20

## 2024-02-21 RX ORDER — DULOXETIN HYDROCHLORIDE 60 MG/1
60 CAPSULE, DELAYED RELEASE ORAL DAILY
Qty: 30 CAPSULE | Refills: 4 | Status: SHIPPED | OUTPATIENT
Start: 2024-02-21 | End: 2024-07-20

## 2024-02-21 RX ORDER — EZETIMIBE 10 MG/1
10 TABLET ORAL DAILY
COMMUNITY
Start: 2023-11-21 | End: 2024-11-20

## 2024-02-21 NOTE — BH CRISIS PLAN
Client Name: Ashlie Scott       Client YOB: 1950    [place] Safety Plan      Creation Date: 2/21/24 Update Date: 2/21/24   Created By: Peri Copeland MD Last Updated By: Peri Copeland MD      Step 1: Warning Signs:   Warning Signs   continual crying, palpitations            Step 2: Internal Coping Strategies:   Internal Coping Strategies   breathing slowly, to go for a walk            Step 3: People and social settings that provide distraction:   Name Contact Information   Sister Nick in my cell phone   Best friend Ashlie in my cell phone            Step 4: People whom I can ask for help during a crisis:      Name Contact Information    As above people       Step 5: Professionals or agencies I can contact during a crisis:      Clinican/Agency Name Phone Emergency Contact    [facility] [phone]       [facility] Emergency Department Emergency Department Phone Emergency Department Address    [facility] [address]         Crisis Phone Numbers:   Suicide Prevention Lifeline: Call or Text  983 Crisis Text Line: Text HOME to 401-409   Please note: Some [facility] do not have a separate number for Child/Adolescent specific crisis. If your county is not listed under Child/Adolescent, please call the adult number for your county      Adult Crisis Numbers: Child/Adolescent Crisis Numbers   [facility]: [phone] [facility]: [phone]   [facility]: [phone] [facility]: [phone]   [facility]: [phone] [place]: [phone]   [facility]: [phone] [place]: [phone]   [place]: [phone]   [facility]: [phone]   [facility]: [phone]   [facility]: [phone] (daytime) [phone] (after hours, weekends, holidays)      Step 6: Making the environment safer (plan for lethal means safety):   Patient did not identify any lethal methods: Yes     Optional:  What is most important to me and worth living for?   My family     [place] Safety Plan. Deepti Powers and Zachery Scott. Used with permission of the authors.
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
## 2024-07-01 ENCOUNTER — OFFICE VISIT (OUTPATIENT)
Dept: PSYCHIATRY | Facility: CLINIC | Age: 74
End: 2024-07-01
Payer: COMMERCIAL

## 2024-07-01 VITALS
BODY MASS INDEX: 45.5 KG/M2 | HEIGHT: 61 IN | HEART RATE: 92 BPM | SYSTOLIC BLOOD PRESSURE: 135 MMHG | DIASTOLIC BLOOD PRESSURE: 78 MMHG | WEIGHT: 241 LBS

## 2024-07-01 DIAGNOSIS — F31.76 BIPOLAR I DISORDER, MOST RECENT EPISODE DEPRESSED, IN FULL REMISSION (HCC): Primary | Chronic | ICD-10-CM

## 2024-07-01 DIAGNOSIS — G47.09 OTHER INSOMNIA: Chronic | ICD-10-CM

## 2024-07-01 DIAGNOSIS — F43.12 POST-TRAUMATIC STRESS DISORDER, CHRONIC: Chronic | ICD-10-CM

## 2024-07-01 DIAGNOSIS — F60.9 PERSONALITY DISORDER (HCC): Chronic | ICD-10-CM

## 2024-07-01 DIAGNOSIS — Z79.899 LONG-TERM USE OF HIGH-RISK MEDICATION: Chronic | ICD-10-CM

## 2024-07-01 DIAGNOSIS — F41.1 GAD (GENERALIZED ANXIETY DISORDER): Chronic | ICD-10-CM

## 2024-07-01 PROCEDURE — 90833 PSYTX W PT W E/M 30 MIN: CPT | Performed by: PSYCHIATRY & NEUROLOGY

## 2024-07-01 PROCEDURE — G2211 COMPLEX E/M VISIT ADD ON: HCPCS | Performed by: PSYCHIATRY & NEUROLOGY

## 2024-07-01 PROCEDURE — 99214 OFFICE O/P EST MOD 30 MIN: CPT | Performed by: PSYCHIATRY & NEUROLOGY

## 2024-07-01 RX ORDER — ARIPIPRAZOLE 15 MG/1
15 TABLET ORAL
Qty: 30 TABLET | Refills: 4 | Status: SHIPPED | OUTPATIENT
Start: 2024-07-01 | End: 2024-11-28

## 2024-07-01 RX ORDER — DULOXETIN HYDROCHLORIDE 60 MG/1
60 CAPSULE, DELAYED RELEASE ORAL DAILY
Qty: 30 CAPSULE | Refills: 4 | Status: SHIPPED | OUTPATIENT
Start: 2024-07-01 | End: 2024-11-28

## 2024-07-01 RX ORDER — HYDROXYZINE HYDROCHLORIDE 25 MG/1
TABLET, FILM COATED ORAL
Qty: 90 TABLET | Refills: 4 | Status: SHIPPED | OUTPATIENT
Start: 2024-07-01 | End: 2024-11-28

## 2024-07-01 NOTE — BH TREATMENT PLAN
"TREATMENT PLAN (Medication Management Only)        [facility] - [facility]CH[facility]RIC [facility]    Name/Date of Birth/MRN:  Pratima Scott 74 y.o. 1950 MRN: [id number]  Date of Treatment Plan: July 1, 2024  Diagnosis/Diagnoses:   1. Bipolar I disorder, most recent episode depressed, in full remission (HCC)    2. TATIANA (generalized anxiety disorder)    3. Post-traumatic stress disorder, chronic    4. Personality disorder (HCC)    5. Long-term use of high-risk medication    6. Other insomnia      Strengths/Personal Resources for Self-Care: \"I try not to dwell on things\".  Area/Areas of need (in own words): \"smiling more\".  1. Long Term Goal:   maintain control of anxiety, maintain control of depression, maintain mood stability  Target Date: 3 months - 10/1/2024  Person/Persons responsible for completion of goal: Pratima  2.  Short Term Objective (s) - How will we reach this goal?:   A.  Provider new recommended medication/dosage changes and/or continue medication(s): continue current medications as prescribed (Cymbalta, Abilify, and Atarax).  B.  N/A.  C.  N/A.  Target Date: 3 months - 10/1/2024  Person/Persons Responsible for Completion of Goal: Pratima   Progress Towards Goals: progressing  Treatment Modality: medication management every 3 months  Review due 180 days from date of this plan: 6 months - 1/1/2025  Expected length of service: ongoing treatment unless revised  My Physician/PA/NP and I have developed this plan together and I agree to work on the goals and objectives. I understand the treatment goals that were developed for my treatment.  Electronic Signatures: on file (unless signed below)    Peri Copeland MD 07/01/24  "

## 2024-08-25 DIAGNOSIS — F41.1 GAD (GENERALIZED ANXIETY DISORDER): Chronic | ICD-10-CM

## 2024-08-26 RX ORDER — HYDROXYZINE HYDROCHLORIDE 25 MG/1
TABLET, FILM COATED ORAL
Qty: 270 TABLET | Refills: 2 | Status: SHIPPED | OUTPATIENT
Start: 2024-08-26 | End: 2025-01-23
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
## 2024-11-04 ENCOUNTER — OFFICE VISIT (OUTPATIENT)
Dept: PSYCHIATRY | Facility: CLINIC | Age: 74
End: 2024-11-04
Payer: COMMERCIAL

## 2024-11-04 VITALS
HEIGHT: 61 IN | WEIGHT: 234 LBS | DIASTOLIC BLOOD PRESSURE: 74 MMHG | HEART RATE: 79 BPM | BODY MASS INDEX: 44.18 KG/M2 | SYSTOLIC BLOOD PRESSURE: 120 MMHG

## 2024-11-04 DIAGNOSIS — F60.9 PERSONALITY DISORDER (HCC): Chronic | ICD-10-CM

## 2024-11-04 DIAGNOSIS — F43.12 POST-TRAUMATIC STRESS DISORDER, CHRONIC: Chronic | ICD-10-CM

## 2024-11-04 DIAGNOSIS — F41.1 GAD (GENERALIZED ANXIETY DISORDER): Chronic | ICD-10-CM

## 2024-11-04 DIAGNOSIS — F31.76 BIPOLAR I DISORDER, MOST RECENT EPISODE DEPRESSED, IN FULL REMISSION (HCC): Primary | Chronic | ICD-10-CM

## 2024-11-04 DIAGNOSIS — Z79.899 LONG-TERM USE OF HIGH-RISK MEDICATION: Chronic | ICD-10-CM

## 2024-11-04 DIAGNOSIS — G47.09 OTHER INSOMNIA: Chronic | ICD-10-CM

## 2024-11-04 PROBLEM — L57.8 SOLAR DEGENERATION: Status: ACTIVE | Noted: 2022-08-16

## 2024-11-04 PROCEDURE — 90833 PSYTX W PT W E/M 30 MIN: CPT | Performed by: PSYCHIATRY & NEUROLOGY

## 2024-11-04 PROCEDURE — 99214 OFFICE O/P EST MOD 30 MIN: CPT | Performed by: PSYCHIATRY & NEUROLOGY

## 2024-11-04 PROCEDURE — G2211 COMPLEX E/M VISIT ADD ON: HCPCS | Performed by: PSYCHIATRY & NEUROLOGY

## 2024-11-04 RX ORDER — ARIPIPRAZOLE 15 MG/1
15 TABLET ORAL
Qty: 30 TABLET | Refills: 4 | Status: SHIPPED | OUTPATIENT
Start: 2024-11-04 | End: 2025-04-03

## 2024-11-04 RX ORDER — DULOXETIN HYDROCHLORIDE 60 MG/1
60 CAPSULE, DELAYED RELEASE ORAL DAILY
Qty: 30 CAPSULE | Refills: 4 | Status: SHIPPED | OUTPATIENT
Start: 2024-11-04 | End: 2025-04-03

## 2024-11-04 RX ORDER — SEMAGLUTIDE 1 MG/.5ML
1 INJECTION, SOLUTION SUBCUTANEOUS WEEKLY
COMMUNITY
Start: 2024-10-29

## 2024-11-04 RX ORDER — HYDROXYZINE HYDROCHLORIDE 25 MG/1
TABLET, FILM COATED ORAL
Qty: 90 TABLET | Refills: 4 | Status: SHIPPED | OUTPATIENT
Start: 2024-11-04 | End: 2025-04-03

## 2024-11-04 NOTE — BH TREATMENT PLAN
"TREATMENT PLAN (Medication Management Only)        [facility]    Name/Date of Birth/MRN:  Pratima Scott 74 y.o. 1950 MRN: [id number]  Date of Treatment Plan: November 4, 2024  Diagnosis/Diagnoses:   1. Bipolar I disorder, most recent episode depressed, in full remission (HCC)    2. TATIANA (generalized anxiety disorder)    3. Post-traumatic stress disorder, chronic    4. Personality disorder (HCC)    5. Long-term use of high-risk medication    6. Other insomnia      Strengths/Personal Resources for Self-Care: \"I still talk to my friend, if there is an issue, I feel very comfortable talking to her or my sister\".  Area/Areas of need (in own words): \"continuing losing weight\".  1. Long Term Goal:   maintain control of anxiety, maintain control of depression, maintain mood stability  Target Date: 4 months - 3/4/2025  Person/Persons responsible for completion of goal: Pratima  2.  Short Term Objective (s) - How will we reach this goal?:   A.  Provider new recommended medication/dosage changes and/or continue medication(s): continue current medications as prescribed (Cymbalta, Abilify, and Atarax).  B.  N/A.  C.  N/A.  Target Date: 4 months - 3/4/2025  Person/Persons Responsible for Completion of Goal: Pratima   Progress Towards Goals: stable  Treatment Modality: medication management every 4 months  Review due 180 days from date of this plan: 6 months - 5/4/2025  Expected length of service: maintenance unless revised  My Physician/PA/NP and I have developed this plan together and I agree to work on the goals and objectives. I understand the treatment goals that were developed for my treatment.  Electronic Signatures: on file (unless signed below)    Peri Copeland MD 11/04/24  "

## 2024-11-04 NOTE — ASSESSMENT & PLAN NOTE
Continues to have on and off difficulty sleeping  Continue Atarax 25 mg daily as needed to control anxiety symptoms and 25 mg to 50 mg at bedtime as needed to help with sleep

## 2024-11-04 NOTE — ASSESSMENT & PLAN NOTE
Only mild occasional anxiety symptoms  Continue Atarax 25 mg daily as needed to control anxiety symptoms and 25 mg to 50 mg at bedtime as needed to help with sleep  Orders:    DULoxetine (CYMBALTA) 60 mg delayed release capsule; Take 1 capsule (60 mg total) by mouth daily    hydrOXYzine HCL (ATARAX) 25 mg tablet; May take 1 tablet (25 mg total) by mouth daily as needed for anxiety. May also take 1-2 tablets (25-50 mg total) daily at bedtime as needed for anxiety.

## 2024-11-04 NOTE — BH CRISIS PLAN
Client Name: Ashlie Scott       Client YOB: 1950    Alee Safety Plan      Creation Date: 2/21/24 Update Date: 11/4/24   Created By: Peri Copeland MD Last Updated By: Peri Copeland MD      Step 1: Warning Signs:   Warning Signs   continual crying, palpitations            Step 2: Internal Coping Strategies:   Internal Coping Strategies   breathing slowly, to go for a walk            Step 3: People and social settings that provide distraction:   Name Contact Information   Sister Nick in my cell phone   Best friend Ashlie in my cell phone            Step 4: People whom I can ask for help during a crisis:      Name Contact Information    As above people       Step 5: Professionals or agencies I can contact during a crisis:      Clinican/Agency Name Phone Emergency Contact    [facility] [phone]       [facility] Emergency Department Emergency Department Phone Emergency Department Address    [facility] [address]         Crisis Phone Numbers:   Suicide Prevention Lifeline: Call or Text  982 Crisis Text Line: Text HOME to 317-718   Please note: Some [facility] do not have a separate number for Child/Adolescent specific crisis. If your county is not listed under Child/Adolescent, please call the adult number for your county      Adult Crisis Numbers: Child/Adolescent Crisis Numbers   [facility]: [phone] [facility]: [phone]   [facility]: [phone] [facility]: [phone]   [facility]: [phone] [place]: [phone]   [facility]: [phone] [place]: [phone]   [facility]: [phone]   [facility]: [phone]   [facility]: [phone]   [facility] [place] [facility]: [phone] (daytime) [phone] (after hours, weekends, holidays)      Step 6: Making the environment safer (plan for lethal means safety):   Patient did not identify any lethal methods: Yes     Optional:  What is most important to me and worth living for?   My family     [place]lee Safety Plan. Deepti Powers and Zachery Scott. Used with permission of the authors.

## 2024-11-04 NOTE — ASSESSMENT & PLAN NOTE
Mood is stable  Continue Cymbalta 60 mg daily to control depressive symptoms  Continue Abilify 15 mg every evening to help with mood  Orders:    ARIPiprazole (ABILIFY) 15 mg tablet; Take 1 tablet (15 mg total) by mouth daily at bedtime    DULoxetine (CYMBALTA) 60 mg delayed release capsule; Take 1 capsule (60 mg total) by mouth daily
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
## 2025-03-04 ENCOUNTER — OFFICE VISIT (OUTPATIENT)
Dept: PSYCHIATRY | Facility: CLINIC | Age: 75
End: 2025-03-04
Payer: COMMERCIAL

## 2025-03-04 VITALS
BODY MASS INDEX: 41.16 KG/M2 | SYSTOLIC BLOOD PRESSURE: 140 MMHG | DIASTOLIC BLOOD PRESSURE: 73 MMHG | WEIGHT: 218 LBS | HEART RATE: 83 BPM | HEIGHT: 61 IN

## 2025-03-04 DIAGNOSIS — G47.09 OTHER INSOMNIA: Chronic | ICD-10-CM

## 2025-03-04 DIAGNOSIS — F41.1 GAD (GENERALIZED ANXIETY DISORDER): Chronic | ICD-10-CM

## 2025-03-04 DIAGNOSIS — F60.9 PERSONALITY DISORDER (HCC): Chronic | ICD-10-CM

## 2025-03-04 DIAGNOSIS — Z79.899 LONG-TERM USE OF HIGH-RISK MEDICATION: Chronic | ICD-10-CM

## 2025-03-04 DIAGNOSIS — F43.12 POST-TRAUMATIC STRESS DISORDER, CHRONIC: Chronic | ICD-10-CM

## 2025-03-04 DIAGNOSIS — F31.76 BIPOLAR I DISORDER, MOST RECENT EPISODE DEPRESSED, IN FULL REMISSION (HCC): Primary | Chronic | ICD-10-CM

## 2025-03-04 PROCEDURE — G2211 COMPLEX E/M VISIT ADD ON: HCPCS | Performed by: PSYCHIATRY & NEUROLOGY

## 2025-03-04 PROCEDURE — 90833 PSYTX W PT W E/M 30 MIN: CPT | Performed by: PSYCHIATRY & NEUROLOGY

## 2025-03-04 PROCEDURE — 99214 OFFICE O/P EST MOD 30 MIN: CPT | Performed by: PSYCHIATRY & NEUROLOGY

## 2025-03-04 RX ORDER — HYDROXYZINE HYDROCHLORIDE 25 MG/1
TABLET, FILM COATED ORAL
Qty: 90 TABLET | Refills: 4 | Status: SHIPPED | OUTPATIENT
Start: 2025-03-04 | End: 2025-08-01

## 2025-03-04 RX ORDER — DULOXETIN HYDROCHLORIDE 60 MG/1
60 CAPSULE, DELAYED RELEASE ORAL DAILY
Qty: 30 CAPSULE | Refills: 4 | Status: SHIPPED | OUTPATIENT
Start: 2025-03-04 | End: 2025-08-01

## 2025-03-04 RX ORDER — SEMAGLUTIDE 1.7 MG/.75ML
1.7 INJECTION, SOLUTION SUBCUTANEOUS WEEKLY
COMMUNITY
Start: 2025-02-17

## 2025-03-04 RX ORDER — ARIPIPRAZOLE 15 MG/1
15 TABLET ORAL
Qty: 30 TABLET | Refills: 4 | Status: SHIPPED | OUTPATIENT
Start: 2025-03-04 | End: 2025-08-01

## 2025-03-04 NOTE — ASSESSMENT & PLAN NOTE
Mood has been stable    Continue Cymbalta 60 mg daily to control depressive symptoms and anxiety symptoms  Continue Abilify 15 mg every evening to help with mood  Orders:    Comprehensive metabolic panel; Future    CBC and differential; Future    Hemoglobin A1C; Future    ARIPiprazole (ABILIFY) 15 mg tablet; Take 1 tablet (15 mg total) by mouth daily at bedtime    DULoxetine (CYMBALTA) 60 mg delayed release capsule; Take 1 capsule (60 mg total) by mouth daily

## 2025-03-04 NOTE — ASSESSMENT & PLAN NOTE
Occasional anxiety symptoms    Continue Cymbalta 60 mg daily to control depressive symptoms and anxiety symptoms  Continue Atarax 25 mg daily as needed and 25 mg to 50 mg at bedtime as needed to help with anxiety and help with sleep  Orders:    DULoxetine (CYMBALTA) 60 mg delayed release capsule; Take 1 capsule (60 mg total) by mouth daily    hydrOXYzine HCL (ATARAX) 25 mg tablet; May take 1 tablet (25 mg total) by mouth daily as needed for anxiety. May also take 1-2 tablets (25-50 mg total) daily at bedtime as needed for anxiety.

## 2025-03-04 NOTE — ASSESSMENT & PLAN NOTE
Still has some difficulty sleeping    Continue Atarax 25 mg daily as needed and 25 mg to 50 mg at bedtime as needed to help with anxiety and help with sleep  Orders:    hydrOXYzine HCL (ATARAX) 25 mg tablet; May take 1 tablet (25 mg total) by mouth daily as needed for anxiety. May also take 1-2 tablets (25-50 mg total) daily at bedtime as needed for anxiety.     Birth Control Pills Pregnancy And Lactation Text: This medication should be avoided if pregnant and for the first 30 days post-partum.

## 2025-03-04 NOTE — ASSESSMENT & PLAN NOTE
Follows with family physician for glucose and lipid monitoring due to current therapy with antipsychotic medication  Monitor lipid profile and hemoglobin A1C yearly due to current therapy with antipsychotic medication - gets labs done with PCP  Lab slips for CBC/diff, CMP, and hemoglobin A1C were also issued today to add to labs monitored by PCP  Orders:    Comprehensive metabolic panel; Future    CBC and differential; Future    Hemoglobin A1C; Future

## 2025-03-12 ENCOUNTER — RESULTS FOLLOW-UP (OUTPATIENT)
Dept: PSYCHIATRY | Facility: CLINIC | Age: 75
End: 2025-03-12

## 2025-03-12 LAB
ALBUMIN SERPL-MCNC: 4 G/DL (ref 3.5–5.7)
ALP SERPL-CCNC: 86 U/L (ref 35–120)
ALT SERPL-CCNC: 27 U/L
ANION GAP SERPL CALCULATED.3IONS-SCNC: 9 MMOL/L (ref 3–11)
AST SERPL-CCNC: 23 U/L
BASOPHILS # BLD AUTO: 0 THOU/CMM (ref 0–0.1)
BASOPHILS NFR BLD AUTO: 1 %
BILIRUB SERPL-MCNC: 0.9 MG/DL (ref 0.2–1)
BUN SERPL-MCNC: 13 MG/DL (ref 7–25)
CALCIUM SERPL-MCNC: 9.6 MG/DL (ref 8.5–10.5)
CHLORIDE SERPL-SCNC: 103 MMOL/L (ref 100–109)
CO2 SERPL-SCNC: 29 MMOL/L (ref 21–31)
CREAT SERPL-MCNC: 0.92 MG/DL (ref 0.4–1.1)
CYTOLOGY CMNT CVX/VAG CYTO-IMP: NORMAL
DIFFERENTIAL METHOD BLD: ABNORMAL
EOSINOPHIL # BLD AUTO: 0.2 THOU/CMM (ref 0–0.5)
EOSINOPHIL NFR BLD AUTO: 3 %
ERYTHROCYTE [DISTWIDTH] IN BLOOD BY AUTOMATED COUNT: 13.5 % (ref 12–16)
EST. AVERAGE GLUCOSE BLD GHB EST-MCNC: 103 MG/DL
GFR/BSA.PRED SERPLBLD CYS-BASED-ARV: 66 ML/MIN/{1.73_M2}
GLUCOSE SERPL-MCNC: 95 MG/DL (ref 65–99)
HBA1C MFR BLD: 5.2 %
HCT VFR BLD AUTO: 44.6 % (ref 35–43)
HGB BLD-MCNC: 15 G/DL (ref 11.5–14.5)
LYMPHOCYTES # BLD AUTO: 3.1 THOU/CMM (ref 1–3)
LYMPHOCYTES NFR BLD AUTO: 43 %
MCH RBC QN AUTO: 31.8 PG (ref 26–34)
MCHC RBC AUTO-ENTMCNC: 33.6 G/DL (ref 32–37)
MCV RBC AUTO: 95 FL (ref 80–100)
MONOCYTES # BLD AUTO: 0.5 THOU/CMM (ref 0.3–1)
MONOCYTES NFR BLD AUTO: 7 %
NEUTROPHILS # BLD AUTO: 3.3 THOU/CMM (ref 1.8–7.8)
NEUTROPHILS NFR BLD AUTO: 46 %
PLATELET # BLD AUTO: 230 THOU/CMM (ref 140–350)
PMV BLD REES-ECKER: 9.3 FL (ref 7.5–11.3)
POTASSIUM SERPL-SCNC: 4.5 MMOL/L (ref 3.5–5.2)
PROT SERPL-MCNC: 6.4 G/DL (ref 6.3–8.3)
RBC # BLD AUTO: 4.72 MILL/CMM (ref 3.7–4.7)
SODIUM SERPL-SCNC: 141 MMOL/L (ref 135–145)
WBC # BLD AUTO: 7.1 THOU/CMM (ref 4–10)

## 2025-03-12 NOTE — RESULT ENCOUNTER NOTE
Message was sent to Pratima to follow up with PCP for abnormal CBC/diff. CMP and Hemoglobin A1C are normal

## 2025-07-29 ENCOUNTER — OFFICE VISIT (OUTPATIENT)
Dept: PSYCHIATRY | Facility: CLINIC | Age: 75
End: 2025-07-29
Payer: COMMERCIAL

## 2025-07-29 VITALS
DIASTOLIC BLOOD PRESSURE: 75 MMHG | HEIGHT: 61 IN | WEIGHT: 213 LBS | BODY MASS INDEX: 40.22 KG/M2 | SYSTOLIC BLOOD PRESSURE: 131 MMHG | HEART RATE: 92 BPM

## 2025-07-29 DIAGNOSIS — Z79.899 LONG-TERM USE OF HIGH-RISK MEDICATION: Chronic | ICD-10-CM

## 2025-07-29 DIAGNOSIS — F41.1 GAD (GENERALIZED ANXIETY DISORDER): Chronic | ICD-10-CM

## 2025-07-29 DIAGNOSIS — F60.9 PERSONALITY DISORDER (HCC): Chronic | ICD-10-CM

## 2025-07-29 DIAGNOSIS — F31.76 BIPOLAR I DISORDER, MOST RECENT EPISODE DEPRESSED, IN FULL REMISSION (HCC): Primary | Chronic | ICD-10-CM

## 2025-07-29 DIAGNOSIS — G47.09 OTHER INSOMNIA: Chronic | ICD-10-CM

## 2025-07-29 DIAGNOSIS — F43.12 POST-TRAUMATIC STRESS DISORDER, CHRONIC: Chronic | ICD-10-CM

## 2025-07-29 PROBLEM — Z85.828 HISTORY OF MALIGNANT NEOPLASM OF SKIN: Status: ACTIVE | Noted: 2025-07-29

## 2025-07-29 PROCEDURE — 90833 PSYTX W PT W E/M 30 MIN: CPT | Performed by: PSYCHIATRY & NEUROLOGY

## 2025-07-29 PROCEDURE — 99214 OFFICE O/P EST MOD 30 MIN: CPT | Performed by: PSYCHIATRY & NEUROLOGY

## 2025-07-29 RX ORDER — ARIPIPRAZOLE 15 MG/1
15 TABLET ORAL
Qty: 30 TABLET | Refills: 4 | Status: SHIPPED | OUTPATIENT
Start: 2025-07-29 | End: 2025-12-26

## 2025-07-29 RX ORDER — HYDROXYZINE HYDROCHLORIDE 25 MG/1
TABLET, FILM COATED ORAL
Qty: 90 TABLET | Refills: 4 | Status: SHIPPED | OUTPATIENT
Start: 2025-07-29 | End: 2025-12-26

## 2025-07-29 RX ORDER — DULOXETIN HYDROCHLORIDE 60 MG/1
60 CAPSULE, DELAYED RELEASE ORAL DAILY
Qty: 30 CAPSULE | Refills: 4 | Status: SHIPPED | OUTPATIENT
Start: 2025-07-29 | End: 2025-12-26

## 2025-07-30 LAB
CHOLEST SERPL-MCNC: 215 MG/DL
CHOLEST/HDLC SERPL: 3.2 {RATIO}
HDLC SERPL-MCNC: 68 MG/DL (ref 23–92)
LDLC SERPL CALC-MCNC: 121 MG/DL
NONHDLC SERPL-MCNC: 147 MG/DL
TRIGL SERPL-MCNC: 128 MG/DL

## (undated) DEVICE — BETHLEHEM UNIVERSAL  MIONR EXT: Brand: CARDINAL HEALTH

## (undated) DEVICE — STERILE POLYISOPRENE POWDER-FREE SURGICAL GLOVES: Brand: PROTEXIS

## (undated) DEVICE — UNDERGLOVE PROTEXIS  BLUE SZ 7

## (undated) DEVICE — CUFF TOURNIQUET DISP SZ18

## (undated) DEVICE — CURITY NON-ADHERENT STRIPS: Brand: CURITY

## (undated) DEVICE — CHLORAPREP HI-LITE 26ML ORANGE

## (undated) DEVICE — DRAPE C-ARM X-RAY

## (undated) DEVICE — K-WIRE
Type: IMPLANTABLE DEVICE | Site: FOOT | Status: NON-FUNCTIONAL
Brand: ASNIS
Removed: 2019-03-06

## (undated) DEVICE — STOCKINETTE 2P PREROLLD 6X60

## (undated) DEVICE — SUPER SPONGES,MEDIUM: Brand: DERMACEA

## (undated) DEVICE — NEEDLE 25G X 1 1/2

## (undated) DEVICE — PAD GROUNDING ADULT

## (undated) DEVICE — TUBING SUCTION 5MM X 12 FT

## (undated) DEVICE — SYRINGE 10ML LL CONTROL TOP

## (undated) DEVICE — 3M™ STERI-STRIP™ REINFORCED ADHESIVE SKIN CLOSURES, R1542, 1/4 IN X 1-1/2 IN (6 MM X 38 MM), 6 STRIPS/ENVELOPE: Brand: 3M™ STERI-STRIP™

## (undated) DEVICE — KERLIX BANDAGE ROLL: Brand: KERLIX

## (undated) DEVICE — STERILE POLYISOPRENE POWDER-FREE SURGICAL GLOVES WITH EMOLLIENT COATING: Brand: PROTEXIS

## (undated) DEVICE — NEEDLE BLUNT 18 G X 1 1/2IN

## (undated) DEVICE — SUT MONOCRYL 4-0 PS-2 27 IN Y426H

## (undated) DEVICE — GARMENT,MEDLINE,DVT,INT,CALF,FOAM,MED: Brand: MEDLINE

## (undated) DEVICE — U-DRAPE: Brand: CONVERTORS

## (undated) DEVICE — THIN OFFSET (9.0 X 0.38 X 25.0MM)

## (undated) DEVICE — SUT VICRYL 4-0 PS-2 18 IN J496G

## (undated) DEVICE — INTENDED FOR TISSUE SEPARATION, AND OTHER PROCEDURES THAT REQUIRE A SHARP SURGICAL BLADE TO PUNCTURE OR CUT.: Brand: BARD-PARKER ® SAFETYLOCK CARBON RIB-BACK BLADES

## (undated) DEVICE — INTENDED FOR TISSUE SEPARATION, AND OTHER PROCEDURES THAT REQUIRE A SHARP SURGICAL BLADE TO PUNCTURE OR CUT.: Brand: BARD-PARKER SAFETY BLADES SIZE 15, STERILE

## (undated) DEVICE — COUNTERSINK CANN 3MM AO COUPLING

## (undated) DEVICE — PENCIL ELECTROSURG E-Z CLEAN -0035H

## (undated) DEVICE — GAUZE SPONGES,16 PLY: Brand: CURITY

## (undated) DEVICE — COBAN 4 IN STERILE

## (undated) DEVICE — CANNULATED SCREWDRIVER

## (undated) DEVICE — 4-PORT MANIFOLD: Brand: NEPTUNE 2

## (undated) DEVICE — SUT VICRYL 3-0 SH 27 IN J416H